# Patient Record
Sex: MALE | Race: WHITE | NOT HISPANIC OR LATINO | Employment: OTHER | URBAN - METROPOLITAN AREA
[De-identification: names, ages, dates, MRNs, and addresses within clinical notes are randomized per-mention and may not be internally consistent; named-entity substitution may affect disease eponyms.]

---

## 2019-01-04 ENCOUNTER — HOSPITAL ENCOUNTER (OUTPATIENT)
Dept: PAIN MEDICINE | Facility: HOSPITAL | Age: 49
Discharge: HOME | End: 2019-01-04
Attending: PHYSICAL MEDICINE & REHABILITATION | Admitting: PHYSICAL MEDICINE & REHABILITATION

## 2019-01-18 ENCOUNTER — HOSPITAL ENCOUNTER (OUTPATIENT)
Dept: PAIN MEDICINE | Facility: HOSPITAL | Age: 49
Discharge: HOME | End: 2019-01-18
Attending: PHYSICAL MEDICINE & REHABILITATION | Admitting: PHYSICAL MEDICINE & REHABILITATION

## 2019-02-01 ENCOUNTER — HOSPITAL ENCOUNTER (OUTPATIENT)
Dept: PAIN MEDICINE | Facility: HOSPITAL | Age: 49
Discharge: HOME | End: 2019-02-01
Attending: PHYSICAL MEDICINE & REHABILITATION | Admitting: PHYSICAL MEDICINE & REHABILITATION

## 2019-02-15 ENCOUNTER — HOSPITAL ENCOUNTER (OUTPATIENT)
Dept: PAIN MEDICINE | Facility: HOSPITAL | Age: 49
Discharge: HOME | End: 2019-02-15
Attending: PHYSICAL MEDICINE & REHABILITATION | Admitting: PHYSICAL MEDICINE & REHABILITATION

## 2019-03-15 ENCOUNTER — HOSPITAL ENCOUNTER (OUTPATIENT)
Dept: PAIN MEDICINE | Facility: HOSPITAL | Age: 49
Discharge: HOME | End: 2019-03-15
Attending: PHYSICAL MEDICINE & REHABILITATION | Admitting: PHYSICAL MEDICINE & REHABILITATION

## 2019-03-20 ENCOUNTER — HOSPITAL ENCOUNTER (OUTPATIENT)
Dept: PAIN MEDICINE | Facility: HOSPITAL | Age: 49
Discharge: HOME | End: 2019-03-20
Attending: PHYSICAL MEDICINE & REHABILITATION | Admitting: PHYSICAL MEDICINE & REHABILITATION

## 2019-04-02 ENCOUNTER — ON CAMPUS - OUTPATIENT (AMBULATORY)
Dept: URBAN - METROPOLITAN AREA HOSPITAL 2 | Facility: HOSPITAL | Age: 49
End: 2019-04-02

## 2019-04-02 ENCOUNTER — OFFICE (AMBULATORY)
Dept: URBAN - METROPOLITAN AREA PATHOLOGY 4 | Facility: PATHOLOGY | Age: 49
End: 2019-04-02

## 2019-04-02 ENCOUNTER — HOSPITAL ENCOUNTER (OUTPATIENT)
Dept: OTHER | Facility: HOSPITAL | Age: 49
Setting detail: SPECIMEN
Discharge: HOME | End: 2019-04-02
Attending: INTERNAL MEDICINE | Admitting: INTERNAL MEDICINE

## 2019-04-02 VITALS
SYSTOLIC BLOOD PRESSURE: 122 MMHG | HEART RATE: 103 BPM | DIASTOLIC BLOOD PRESSURE: 60 MMHG | HEART RATE: 92 BPM | WEIGHT: 267 LBS | DIASTOLIC BLOOD PRESSURE: 73 MMHG | HEART RATE: 104 BPM | RESPIRATION RATE: 16 BRPM | RESPIRATION RATE: 18 BRPM | DIASTOLIC BLOOD PRESSURE: 68 MMHG | SYSTOLIC BLOOD PRESSURE: 101 MMHG | DIASTOLIC BLOOD PRESSURE: 81 MMHG | DIASTOLIC BLOOD PRESSURE: 79 MMHG | HEART RATE: 85 BPM | OXYGEN SATURATION: 98 % | TEMPERATURE: 97 F | OXYGEN SATURATION: 90 % | HEART RATE: 108 BPM | HEART RATE: 106 BPM | DIASTOLIC BLOOD PRESSURE: 53 MMHG | OXYGEN SATURATION: 95 % | DIASTOLIC BLOOD PRESSURE: 62 MMHG | DIASTOLIC BLOOD PRESSURE: 78 MMHG | HEART RATE: 100 BPM | HEART RATE: 94 BPM | SYSTOLIC BLOOD PRESSURE: 113 MMHG | SYSTOLIC BLOOD PRESSURE: 119 MMHG | OXYGEN SATURATION: 97 % | SYSTOLIC BLOOD PRESSURE: 99 MMHG | SYSTOLIC BLOOD PRESSURE: 127 MMHG | OXYGEN SATURATION: 96 % | HEIGHT: 69 IN

## 2019-04-02 DIAGNOSIS — K62.5 HEMORRHAGE OF ANUS AND RECTUM: ICD-10-CM

## 2019-04-02 DIAGNOSIS — D12.3 BENIGN NEOPLASM OF TRANSVERSE COLON: ICD-10-CM

## 2019-04-02 DIAGNOSIS — R19.7 DIARRHEA, UNSPECIFIED: ICD-10-CM

## 2019-04-02 DIAGNOSIS — K64.8 OTHER HEMORRHOIDS: ICD-10-CM

## 2019-04-02 LAB
GI HISTOLOGY: A. UNSPECIFIED: (no result)
GI HISTOLOGY: B. UNSPECIFIED: (no result)
GI HISTOLOGY: PDF REPORT: (no result)

## 2019-04-02 PROCEDURE — 88305 TISSUE EXAM BY PATHOLOGIST: CPT | Mod: 26 | Performed by: INTERNAL MEDICINE

## 2019-04-02 PROCEDURE — 45380 COLONOSCOPY AND BIOPSY: CPT | Performed by: INTERNAL MEDICINE

## 2019-05-08 ENCOUNTER — HOSPITAL ENCOUNTER (OUTPATIENT)
Dept: PAIN MEDICINE | Facility: HOSPITAL | Age: 49
Discharge: HOME | End: 2019-05-08
Attending: PHYSICAL MEDICINE & REHABILITATION | Admitting: PHYSICAL MEDICINE & REHABILITATION

## 2019-07-02 PROBLEM — G89.29 CHRONIC LOW BACK PAIN: Status: ACTIVE | Noted: 2019-01-04

## 2019-07-02 PROBLEM — I10 HYPERTENSION: Status: ACTIVE | Noted: 2019-01-02

## 2019-07-02 PROBLEM — Z79.899 OTHER LONG TERM (CURRENT) DRUG THERAPY: Status: ACTIVE | Noted: 2019-03-15

## 2019-07-02 PROBLEM — E78.5 HYPERLIPIDEMIA: Status: ACTIVE | Noted: 2019-01-02

## 2019-07-02 PROBLEM — M54.16 LUMBAR RADICULOPATHY: Status: ACTIVE | Noted: 2019-01-04

## 2019-07-02 PROBLEM — E55.9 VITAMIN D DEFICIENCY: Status: ACTIVE | Noted: 2019-01-02

## 2019-07-02 PROBLEM — E11.49 TYPE 2 DIABETES MELLITUS WITH OTHER DIABETIC NEUROLOGICAL COMPLICATION (HCC): Status: ACTIVE | Noted: 2018-12-28

## 2019-07-02 PROBLEM — M46.1 SACROILIITIS, NOT ELSEWHERE CLASSIFIED: Status: ACTIVE | Noted: 2019-01-04

## 2019-07-02 PROBLEM — M54.50 CHRONIC LOW BACK PAIN: Status: ACTIVE | Noted: 2019-01-04

## 2019-07-02 PROBLEM — M51.36 DEGENERATION OF INTERVERTEBRAL DISC OF LUMBAR REGION: Status: ACTIVE | Noted: 2019-01-04

## 2019-07-02 RX ORDER — CITALOPRAM 20 MG/1
TABLET ORAL EVERY 24 HOURS
COMMUNITY
Start: 2019-01-02

## 2019-07-02 RX ORDER — METOPROLOL TARTRATE 50 MG/1
TABLET, FILM COATED ORAL EVERY 12 HOURS
COMMUNITY
Start: 2019-01-02

## 2019-07-02 RX ORDER — ATORVASTATIN CALCIUM 40 MG/1
TABLET, FILM COATED ORAL
COMMUNITY
Start: 2019-01-02

## 2019-07-02 RX ORDER — HYDROCODONE BITARTRATE AND ACETAMINOPHEN 10; 325 MG/1; MG/1
TABLET ORAL
COMMUNITY
Start: 2019-03-15 | End: 2019-07-03 | Stop reason: SDUPTHER

## 2019-07-02 RX ORDER — CETIRIZINE HYDROCHLORIDE 10 MG/1
TABLET ORAL
COMMUNITY
Start: 2019-01-02

## 2019-07-02 RX ORDER — LISINOPRIL 20 MG/1
TABLET ORAL
COMMUNITY
Start: 2019-01-02 | End: 2020-05-11

## 2019-07-02 RX ORDER — CHLORAL HYDRATE 500 MG
CAPSULE ORAL
COMMUNITY
Start: 2019-01-02

## 2019-07-02 RX ORDER — GABAPENTIN 600 MG/1
TABLET ORAL EVERY 6 HOURS
COMMUNITY
Start: 2019-03-15 | End: 2019-08-28 | Stop reason: SDUPTHER

## 2019-07-02 RX ORDER — TIZANIDINE HYDROCHLORIDE 6 MG/1
CAPSULE, GELATIN COATED ORAL
COMMUNITY
Start: 2019-01-02 | End: 2020-01-31

## 2019-07-02 RX ORDER — TOPIRAMATE 25 MG/1
TABLET ORAL EVERY 12 HOURS
COMMUNITY
Start: 2019-01-02

## 2019-07-02 RX ORDER — AMLODIPINE BESYLATE 2.5 MG/1
TABLET ORAL
COMMUNITY
Start: 2019-01-02 | End: 2020-05-11 | Stop reason: DRUGHIGH

## 2019-07-02 RX ORDER — BLOOD-GLUCOSE METER
EACH MISCELLANEOUS
COMMUNITY
Start: 2018-12-28

## 2019-07-02 RX ORDER — LANCETS
EACH MISCELLANEOUS
COMMUNITY
Start: 2018-12-28

## 2019-07-02 RX ORDER — PEN NEEDLE, DIABETIC 30 GX3/16"
NEEDLE, DISPOSABLE MISCELLANEOUS
COMMUNITY
Start: 2019-03-28

## 2019-07-02 RX ORDER — ASPIRIN 81 MG/1
TABLET ORAL
COMMUNITY
Start: 2019-01-02

## 2019-07-02 RX ORDER — POTASSIUM CHLORIDE 1500 MG/1
TABLET, FILM COATED, EXTENDED RELEASE ORAL EVERY 12 HOURS
COMMUNITY
Start: 2019-01-02

## 2019-07-02 RX ORDER — HYDROCHLOROTHIAZIDE 25 MG/1
TABLET ORAL EVERY 24 HOURS
COMMUNITY
Start: 2019-01-02

## 2019-07-02 RX ORDER — SPIRONOLACTONE 100 MG/1
TABLET, FILM COATED ORAL
COMMUNITY
Start: 2019-01-02

## 2019-07-02 RX ORDER — HYDRALAZINE HYDROCHLORIDE 100 MG/1
TABLET, FILM COATED ORAL EVERY 8 HOURS
COMMUNITY
Start: 2019-01-02

## 2019-07-03 ENCOUNTER — OFFICE VISIT (OUTPATIENT)
Dept: PAIN MEDICINE | Facility: CLINIC | Age: 49
End: 2019-07-03

## 2019-07-03 VITALS
TEMPERATURE: 97.9 F | DIASTOLIC BLOOD PRESSURE: 56 MMHG | WEIGHT: 267 LBS | RESPIRATION RATE: 16 BRPM | OXYGEN SATURATION: 98 % | HEART RATE: 67 BPM | SYSTOLIC BLOOD PRESSURE: 93 MMHG | HEIGHT: 69 IN | BODY MASS INDEX: 39.55 KG/M2

## 2019-07-03 DIAGNOSIS — M46.1 SACROILIITIS, NOT ELSEWHERE CLASSIFIED (HCC): ICD-10-CM

## 2019-07-03 DIAGNOSIS — G89.29 CHRONIC MIDLINE LOW BACK PAIN WITH SCIATICA, SCIATICA LATERALITY UNSPECIFIED: Primary | ICD-10-CM

## 2019-07-03 DIAGNOSIS — M54.40 CHRONIC MIDLINE LOW BACK PAIN WITH SCIATICA, SCIATICA LATERALITY UNSPECIFIED: Primary | ICD-10-CM

## 2019-07-03 DIAGNOSIS — M54.16 LUMBAR RADICULOPATHY: ICD-10-CM

## 2019-07-03 DIAGNOSIS — M51.36 DEGENERATION OF INTERVERTEBRAL DISC OF LUMBAR REGION: ICD-10-CM

## 2019-07-03 DIAGNOSIS — Z79.899 OTHER LONG TERM (CURRENT) DRUG THERAPY: ICD-10-CM

## 2019-07-03 PROCEDURE — 99213 OFFICE O/P EST LOW 20 MIN: CPT | Performed by: PHYSICAL MEDICINE & REHABILITATION

## 2019-07-03 PROCEDURE — G0463 HOSPITAL OUTPT CLINIC VISIT: HCPCS | Performed by: PHYSICAL MEDICINE & REHABILITATION

## 2019-07-03 RX ORDER — HYDROCODONE BITARTRATE AND ACETAMINOPHEN 10; 325 MG/1; MG/1
1 TABLET ORAL EVERY 6 HOURS PRN
Qty: 120 TABLET | Refills: 0 | Status: SHIPPED | OUTPATIENT
Start: 2019-07-03 | End: 2019-07-03 | Stop reason: SDUPTHER

## 2019-07-03 RX ORDER — HYDROCODONE BITARTRATE AND ACETAMINOPHEN 10; 325 MG/1; MG/1
1 TABLET ORAL EVERY 6 HOURS PRN
Qty: 120 TABLET | Refills: 0 | Status: SHIPPED | OUTPATIENT
Start: 2019-07-03 | End: 2019-08-28 | Stop reason: SDUPTHER

## 2019-07-03 NOTE — PROGRESS NOTES
Subjective   Sung Encarnacion is a 48 y.o. male.     Low back pain radiating to BLE, 8/10 at worst, 7/10 at best, 9/10 today, always present, varies, began 2005, worsening, stabbing, tingling, worse with all activity and lying down, interferes with ADLs, sleep, activity, some relief with LESIs and b/l SIJ injections in [place], some with Gabapentin and Tizanidine. MRI L-spine with DDD and R S1 impingement. Saw PCP, notes reviewed, as above, with referral for pain management, also has DM2 and HTN, on ASA 325mg. Had 1st right L5/S1 ILESI with 2 days significant relief, still improved, similarly with 2nd and 3rd. Started Norco 5/325mg QID prn, helps but not enough, also 7.5mg QID prn, now 10mg QID prn, helps, has pain with severe weather. Would like to repeat LESIs.         The following portions of the patient's history were reviewed and updated as appropriate: allergies, current medications, past family history, past medical history, past social history, past surgical history and problem list.    Review of Systems   Constitutional: Negative for chills, fatigue and fever.   HENT: Negative for hearing loss and trouble swallowing.    Eyes: Negative for visual disturbance.   Respiratory: Negative for shortness of breath.    Cardiovascular: Negative for chest pain.   Gastrointestinal: Negative for abdominal pain, constipation, diarrhea, nausea and vomiting.   Genitourinary: Negative for urinary incontinence.   Musculoskeletal: Positive for back pain. Negative for arthralgias, joint swelling, myalgias and neck pain.   Neurological: Positive for numbness and headache. Negative for dizziness and weakness.       Objective   Physical Exam   Constitutional: He is oriented to person, place, and time. He appears well-developed and well-nourished.   HENT:   Head: Normocephalic and atraumatic.   Eyes: EOM are normal. Pupils are equal, round, and reactive to light.   Neck: Normal range of motion.   Cardiovascular: Normal rate,  regular rhythm, normal heart sounds and intact distal pulses.   Pulmonary/Chest: Breath sounds normal.   Abdominal: Soft. Bowel sounds are normal. He exhibits no distension. There is no tenderness.   Neurological: He is alert and oriented to person, place, and time. He has normal strength and normal reflexes. He displays normal reflexes. A sensory deficit is present.   Decreased in b/l stocking distribution    Psychiatric: He has a normal mood and affect. His behavior is normal. Thought content normal.         Assessment/Plan   Sung was seen today for back pain, pain scale and pain medication.    Diagnoses and all orders for this visit:    Chronic midline low back pain with sciatica, sciatica laterality unspecified    Lumbar radiculopathy    Degeneration of intervertebral disc of lumbar region    Sacroiliitis, not elsewhere classified (CMS/HCC)    Other long term (current) drug therapy        Had 3 L5/S1 ILESIs with relief, plan to repeat in 8/2019.  Reviewed MRI results.  Cont Gabapentin 600mg TID.   Treatment plan will consist of continuing current medication as long as it remains effective and is necessary, while evaluating patient at each visit and determining if the medication can be lowered or discontinued, while also using nonopioid therapies to reduce reliance on opioids.  INspect reviewed, in order. UDS 3/15/19 in order.  Cont Norco 10/325mg QID prn, failed 7.5/325mg QID prn, failed 5/325mg, NSAIDs, APAP, Tramadol in past.  RTC 1 month for LESI.

## 2019-08-07 ENCOUNTER — HOSPITAL ENCOUNTER (OUTPATIENT)
Dept: GENERAL RADIOLOGY | Facility: HOSPITAL | Age: 49
Discharge: HOME | End: 2019-08-07

## 2019-08-07 ENCOUNTER — HOSPITAL ENCOUNTER (OUTPATIENT)
Dept: PAIN MEDICINE | Facility: HOSPITAL | Age: 49
Discharge: HOME | End: 2019-08-07
Admitting: PHYSICAL MEDICINE & REHABILITATION

## 2019-08-07 VITALS
WEIGHT: 167 LBS | HEIGHT: 69 IN | SYSTOLIC BLOOD PRESSURE: 120 MMHG | BODY MASS INDEX: 24.73 KG/M2 | HEART RATE: 67 BPM | DIASTOLIC BLOOD PRESSURE: 70 MMHG | OXYGEN SATURATION: 100 % | RESPIRATION RATE: 16 BRPM

## 2019-08-07 DIAGNOSIS — M54.16 LUMBAR RADICULOPATHY: ICD-10-CM

## 2019-08-07 DIAGNOSIS — R52 PAIN: ICD-10-CM

## 2019-08-07 DIAGNOSIS — G89.29 CHRONIC MIDLINE LOW BACK PAIN WITH SCIATICA, SCIATICA LATERALITY UNSPECIFIED: ICD-10-CM

## 2019-08-07 DIAGNOSIS — M46.1 SACROILIITIS, NOT ELSEWHERE CLASSIFIED (HCC): ICD-10-CM

## 2019-08-07 DIAGNOSIS — M51.36 DEGENERATION OF INTERVERTEBRAL DISC OF LUMBAR REGION: Primary | ICD-10-CM

## 2019-08-07 DIAGNOSIS — M54.40 CHRONIC MIDLINE LOW BACK PAIN WITH SCIATICA, SCIATICA LATERALITY UNSPECIFIED: ICD-10-CM

## 2019-08-07 PROCEDURE — 77003 FLUOROGUIDE FOR SPINE INJECT: CPT

## 2019-08-07 PROCEDURE — 0 IOPAMIDOL 41 % SOLUTION: Performed by: PHYSICAL MEDICINE & REHABILITATION

## 2019-08-07 PROCEDURE — 25010000002 METHYLPREDNISOLONE PER 40 MG: Performed by: PHYSICAL MEDICINE & REHABILITATION

## 2019-08-07 RX ORDER — METHYLPREDNISOLONE ACETATE 40 MG/ML
80 INJECTION, SUSPENSION INTRA-ARTICULAR; INTRALESIONAL; INTRAMUSCULAR; SOFT TISSUE ONCE
Status: COMPLETED | OUTPATIENT
Start: 2019-08-07 | End: 2019-08-07

## 2019-08-07 RX ADMIN — METHYLPREDNISOLONE ACETATE 80 MG: 40 INJECTION, SUSPENSION INTRA-ARTICULAR; INTRALESIONAL; INTRAMUSCULAR; SOFT TISSUE at 11:47

## 2019-08-07 RX ADMIN — IOPAMIDOL 5 ML: 408 INJECTION, SOLUTION INTRATHECAL at 11:46

## 2019-08-07 NOTE — PATIENT INSTRUCTIONS
Epidural Steroid Injection, Care After  Refer to this sheet in the next few weeks. These instructions provide you with information about caring for yourself after your procedure. Your health care provider may also give you more specific instructions. Your treatment has been planned according to current medical practices, but problems sometimes occur. Call your health care provider if you have any problems or questions after your procedure.  What can I expect after the procedure?  After your procedure, it is common to feel a little discomfort at the injection site.  Follow these instructions at home:  · For 24 hours after the procedure:  ? Avoid using heat on the injection site.  ? Do not take a tub bath, and do not soak in water.  ? Do not drive if you received a medicine to help you relax (sedative).  · If directed, put ice on the injection site:  ? Put ice in a plastic bag.  ? Place a towel between your skin and the bag.  ? Leave the ice on for 20 minutes, 2-3 times a day.  · Return to your normal activities as told by your health care provider. Ask your health care provider what activities are safe for you.  · You may remove the bandage (dressing) after 24 hours.  · Take over-the-counter and prescription medicines only as told by your health care provider.  · Keep all follow-up visits as told by your health care provider. This is important.  Contact a health care provider if:  · You have a fever.  · You continue to have pain and soreness around the injection site, even after taking over-the-counter pain medicine.  · You have severe, sudden, or lasting nausea or vomiting.  Get help right away if:  · You have severe pain at the injection site that is not relieved by medicines.  · You develop a severe headache or a stiff neck.  · You become sensitive to light.  · You have any new numbness or weakness in your legs or arms.  · You lose control of your bladder or bowel movements.  · You have trouble breathing.  This  information is not intended to replace advice given to you by your health care provider. Make sure you discuss any questions you have with your health care provider.  Document Released: 04/03/2012 Document Revised: 05/25/2017 Document Reviewed: 04/04/2017  [employer] Interactive Patient Education © 2019 [employer]

## 2019-08-07 NOTE — H&P
Patient Care Team:  Yola Saldaña APRN as PCP - General (Family Medicine)  Alli Saldaña MD as PCP - Family Medicine    Chief complaint Low back pain    Subjective     Low back pain radiating to BLE, 8/10 at worst, 7/10 at best, 9/10 today, always present, varies, began 2005, worsening, stabbing, tingling, worse with all activity and lying down, interferes with ADLs, sleep, activity, some relief with LESIs and b/l SIJ injections in [place], some with Gabapentin and Tizanidine. MRI L-spine with DDD and R S1 impingement. Saw PCP, notes reviewed, as above, with referral for pain management, also has DM2 and HTN, on ASA 325mg. Had 1st right L5/S1 ILESI with 2 days significant relief, still improved, similarly with 2nd and 3rd. Started Norco 5/325mg QID prn, helps but not enough, also 7.5mg QID prn, now 10mg QID prn, helps, has pain with severe weather. Would like to repeat LESIs. Here for 1st of 3. Denies current infection or illness, anticoagulation, allergy to iodine or contrast.        Review of Systems   Respiratory: Negative for shortness of breath.    Cardiovascular: Negative for chest pain.        Past Medical History:   Diagnosis Date   • CHF (congestive heart failure) (CMS/HCC)    • Chronic back pain    • Diabetes mellitus (CMS/HCC)     Type 2   • Hyperlipidemia    • Hypertension    • Lumbar radiculopathy    • Multilevel degenerative disc disease    • Peripheral neuropathy    • Seasonal allergies    • Vitamin D deficiency      Past Surgical History:   Procedure Laterality Date   • CARDIAC CATHETERIZATION     • MOUTH SURGERY       Family History   Problem Relation Age of Onset   • Diabetes Mother    • Hypertension Father    • Cancer Father      Social History     Tobacco Use   • Smoking status: Former Smoker   • Smokeless tobacco: Never Used   Substance Use Topics   • Alcohol use: Yes     Comment: every few months   • Drug use: No       (Not in a hospital admission)  Allergies:  Penicillin  g    Objective      Vital Signs  Resp:  [16] 16  BP: (119)/(72) 119/72    Physical Exam   Cardiovascular: Normal rate, regular rhythm and normal heart sounds.   Pulmonary/Chest: Effort normal and breath sounds normal.       Results Review:   None      Assessment/Plan       * No active hospital problems. *      ICD-10-CM ICD-9-CM   1. Degeneration of intervertebral disc of lumbar region M51.36 722.52   2. Sacroiliitis, not elsewhere classified (CMS/HCC) M46.1 720.2   3. Chronic midline low back pain with sciatica, sciatica laterality unspecified M54.40 724.2    G89.29 724.3     338.29   4. Lumbar radiculopathy M54.16 724.4         Assessment & Plan    I discussed the patients findings and my recommendations with patient     Had 3 L5/S1 ILESIs with relief, 1st of 3 repeat LESIs today.  Reviewed MRI results.  Cont Gabapentin 600mg TID.   Treatment plan will consist of continuing current medication as long as it remains effective and is necessary, while evaluating patient at each visit and determining if the medication can be lowered or discontinued, while also using nonopioid therapies to reduce reliance on opioids.  [facility] reviewed, in order. UDS 3/15/19 in order.  Cont Norco 10/325mg QID prn, failed 7.5/325mg QID prn, failed 5/325mg, NSAIDs, APAP, Tramadol in past.  RTC 1 month for 2nd LESI.      LUMBAR EPIDURAL STEORID INJECTION    PREOPERATIVE DIAGNOSIS: Lumbar radiculopathy    POSTOPERATIVE DIAGNOSIS: Lumbar radiculopathy    PROCEDURE PERFORMED: Lumbar Epidural Steroid Injection    The patient presents with a history of  [ lumbar ] degenerative disc disease with  radiculitis. The patient presents today for a [ lumbar ]  epidural steroid injection at level right L5/S1 This is the [ first ] procedure. The patient understands the risks and benefits of the procedure and wishes to proceed.  The patient was seen in the preoperative area.  Patient's consent was obtained and updated.  Vitals were taken.  Patient was then  brought to the procedure suite and placed in a prone position. The appropriate anatomic area was widely prepped with Chloraprep and draped in a sterile fashion. Under fluoroscopic guidance using [ caudal tilt AP ] view a 20 gauge styleted tuohy needle was passed through skin anesthetized with 1% Lidocaine without epinephrine.  The needle was advanced using the continuous loss of resistance  technique into the epidural space. Needle tip placement in the epidural space was confirmed by loss of resistance and injection of [ 2 ] mL of preservative free contrast.  Following this [ 4 ] mL of a solution of Depomedrol 80mg and saline 3ml was slowly injected after negative aspiration. A sterile dressing was placed over the puncture site.    The patient tolerated the procedure with [ no complications ]. They were then brought to the post procedure area where they recovered nicely.      Juan Antonio Ceja MD  08/07/19  11:53 AM    Time: Discharge 15 min

## 2019-08-28 ENCOUNTER — HOSPITAL ENCOUNTER (OUTPATIENT)
Dept: GENERAL RADIOLOGY | Facility: HOSPITAL | Age: 49
Discharge: HOME | End: 2019-08-28

## 2019-08-28 ENCOUNTER — HOSPITAL ENCOUNTER (OUTPATIENT)
Dept: PAIN MEDICINE | Facility: HOSPITAL | Age: 49
Discharge: HOME | End: 2019-08-28
Admitting: PHYSICAL MEDICINE & REHABILITATION

## 2019-08-28 VITALS
OXYGEN SATURATION: 100 % | RESPIRATION RATE: 16 BRPM | DIASTOLIC BLOOD PRESSURE: 67 MMHG | TEMPERATURE: 98.3 F | BODY MASS INDEX: 37.77 KG/M2 | SYSTOLIC BLOOD PRESSURE: 109 MMHG | HEART RATE: 64 BPM | WEIGHT: 255 LBS | HEIGHT: 69 IN

## 2019-08-28 DIAGNOSIS — G89.29 CHRONIC MIDLINE LOW BACK PAIN WITH SCIATICA, SCIATICA LATERALITY UNSPECIFIED: Primary | ICD-10-CM

## 2019-08-28 DIAGNOSIS — M54.16 LUMBAR RADICULOPATHY: ICD-10-CM

## 2019-08-28 DIAGNOSIS — M51.36 DEGENERATION OF INTERVERTEBRAL DISC OF LUMBAR REGION: ICD-10-CM

## 2019-08-28 DIAGNOSIS — M54.40 CHRONIC MIDLINE LOW BACK PAIN WITH SCIATICA, SCIATICA LATERALITY UNSPECIFIED: Primary | ICD-10-CM

## 2019-08-28 DIAGNOSIS — R52 PAIN: ICD-10-CM

## 2019-08-28 PROCEDURE — 62323 NJX INTERLAMINAR LMBR/SAC: CPT | Performed by: PHYSICAL MEDICINE & REHABILITATION

## 2019-08-28 PROCEDURE — 77003 FLUOROGUIDE FOR SPINE INJECT: CPT

## 2019-08-28 PROCEDURE — 0 IOPAMIDOL 41 % SOLUTION: Performed by: PHYSICAL MEDICINE & REHABILITATION

## 2019-08-28 PROCEDURE — 25010000002 METHYLPREDNISOLONE PER 40 MG: Performed by: PHYSICAL MEDICINE & REHABILITATION

## 2019-08-28 RX ORDER — HYDROCODONE BITARTRATE AND ACETAMINOPHEN 10; 325 MG/1; MG/1
1 TABLET ORAL EVERY 6 HOURS PRN
Qty: 120 TABLET | Refills: 0 | Status: SHIPPED | OUTPATIENT
Start: 2019-08-28 | End: 2019-08-28 | Stop reason: SDUPTHER

## 2019-08-28 RX ORDER — GABAPENTIN 600 MG/1
600 TABLET ORAL 3 TIMES DAILY
Qty: 90 TABLET | Refills: 5 | Status: SHIPPED | OUTPATIENT
Start: 2019-08-28 | End: 2020-07-06

## 2019-08-28 RX ORDER — HYDROCODONE BITARTRATE AND ACETAMINOPHEN 10; 325 MG/1; MG/1
1 TABLET ORAL EVERY 6 HOURS PRN
Qty: 120 TABLET | Refills: 0 | Status: SHIPPED | OUTPATIENT
Start: 2019-08-28 | End: 2019-12-04 | Stop reason: SDUPTHER

## 2019-08-28 RX ORDER — METHYLPREDNISOLONE ACETATE 40 MG/ML
80 INJECTION, SUSPENSION INTRA-ARTICULAR; INTRALESIONAL; INTRAMUSCULAR; SOFT TISSUE ONCE
Status: COMPLETED | OUTPATIENT
Start: 2019-08-28 | End: 2019-08-28

## 2019-08-28 RX ADMIN — METHYLPREDNISOLONE ACETATE 80 MG: 40 INJECTION, SUSPENSION INTRA-ARTICULAR; INTRALESIONAL; INTRAMUSCULAR; SOFT TISSUE at 15:54

## 2019-08-28 RX ADMIN — IOPAMIDOL 5 ML: 408 INJECTION, SOLUTION INTRATHECAL at 09:45

## 2019-08-28 NOTE — H&P
Patient Care Team:  Yola Saldaña APRN as PCP - General (Family Medicine)  Alli Saldaña MD as PCP - Family Medicine    Chief complaint Low back pain    Subjective     Low back pain radiating to BLE, 8/10 at worst, 7/10 at best, always present, varies, began 2005, worsening, stabbing, tingling, worse with all activity and lying down, interferes with ADLs, sleep, activity, some relief with LESIs and b/l SIJ injections in [place], some with Gabapentin and Tizanidine. MRI L-spine with DDD and R S1 impingement. Saw PCP, notes reviewed, as above, with referral for pain management, also has DM2 and HTN, on ASA 325mg. Had 1st right L5/S1 ILESI with 2 days significant relief, still improved, similarly with 2nd and 3rd. Started Norco 5/325mg QID prn, helps but not enough, also 7.5mg QID prn, now 10mg QID prn, helps, has pain with severe weather. Would like to repeat LESIs. Here for 2nd of 3. Denies current infection or illness, anticoagulation, allergy to iodine or contrast. BP 95/69 immediately prior to procedure.        Review of Systems   Respiratory: Negative for shortness of breath.    Cardiovascular: Negative for chest pain.        Past Medical History:   Diagnosis Date   • CHF (congestive heart failure) (CMS/HCC)    • Chronic back pain    • Diabetes mellitus (CMS/HCC)     Type 2   • Hyperlipidemia    • Hypertension    • Lumbar radiculopathy    • Multilevel degenerative disc disease    • Peripheral neuropathy    • Seasonal allergies    • Vitamin D deficiency      Past Surgical History:   Procedure Laterality Date   • CARDIAC CATHETERIZATION     • MOUTH SURGERY       Family History   Problem Relation Age of Onset   • Diabetes Mother    • Hypertension Father    • Cancer Father      Social History     Tobacco Use   • Smoking status: Former Smoker   • Smokeless tobacco: Never Used   Substance Use Topics   • Alcohol use: Yes     Comment: every few months   • Drug use: No       (Not in a hospital  admission)  Allergies:  Penicillin g    Objective      Vital Signs  Temp:  [98.3 °F (36.8 °C)] 98.3 °F (36.8 °C)  Heart Rate:  [63] 63  Resp:  [16] 16  BP: (93)/(57) 93/57    Physical Exam   Cardiovascular: Normal rate, regular rhythm and normal heart sounds.   Pulmonary/Chest: Effort normal and breath sounds normal.       Results Review:   None      Assessment/Plan       * No active hospital problems. *      ICD-10-CM ICD-9-CM   1. Chronic midline low back pain with sciatica, sciatica laterality unspecified M54.40 724.2    G89.29 724.3     338.29   2. Degeneration of intervertebral disc of lumbar region M51.36 722.52   3. Lumbar radiculopathy M54.16 724.4         Assessment & Plan      I discussed the patients findings and my recommendations with patient     Had 3 L5/S1 ILESIs with relief, 1st of 3 repeat LESIs today.  Reviewed MRI results.  Cont Gabapentin 600mg TID.   Treatment plan will consist of continuing current medication as long as it remains effective and is necessary, while evaluating patient at each visit and determining if the medication can be lowered or discontinued, while also using nonopioid therapies to reduce reliance on opioids.  [facility] reviewed, in order. UDS 3/15/19 in order.  Cont Norco 10/325mg QID prn, failed 7.5/325mg QID prn, failed 5/325mg, NSAIDs, APAP, Tramadol in past.  RTC 1 month for 2nd LESI.      LUMBAR EPIDURAL STEORID INJECTION    PREOPERATIVE DIAGNOSIS: Lumbar radiculopathy    POSTOPERATIVE DIAGNOSIS: Lumbar radiculopathy    PROCEDURE PERFORMED: Lumbar Epidural Steroid Injection    The patient presents with a history of  [ lumbar ] degenerative disc disease with  radiculitis. The patient presents today for a [ lumbar ]  epidural steroid injection at level right L5/S1 This is the [ second ] procedure. The patient understands the risks and benefits of the procedure and wishes to proceed.  The patient was seen in the preoperative area.  Patient's consent was obtained and updated.   Vitals were taken.  Patient was then brought to the procedure suite and placed in a prone position. The appropriate anatomic area was widely prepped with Chloraprep and draped in a sterile fashion. Under fluoroscopic guidance using [ caudal tilt AP ] view a 20 gauge styleted tuohy needle was passed through skin anesthetized with 1% Lidocaine without epinephrine.  The needle was advanced using the continuous loss of resistance  technique into the epidural space at 1cm from the needle hub, confirmed in lateral view. Needle tip placement in the epidural space was confirmed by loss of resistance and injection of [ 2 ] mL of preservative free contrast.  Following this [ 4 ] mL of a solution of Depomedrol 80mg and saline 3ml was slowly injected after negative aspiration. A sterile dressing was placed over the puncture site.    The patient tolerated the procedure with [ no complications ]. They were then brought to the post procedure area where they recovered nicely.      Juan Antonio Ceja MD  08/28/19  9:39 AM    Time: Discharge 15 min

## 2019-08-28 NOTE — ADDENDUM NOTE
Encounter addended by: Heather Biggs RN on: 8/28/2019 3:55 PM   Actions taken: Order list changed, Diagnosis association updated, MAR administration accepted

## 2019-08-28 NOTE — PATIENT INSTRUCTIONS
Epidural Steroid Injection, Care After  Refer to this sheet in the next few weeks. These instructions provide you with information about caring for yourself after your procedure. Your health care provider may also give you more specific instructions. Your treatment has been planned according to current medical practices, but problems sometimes occur. Call your health care provider if you have any problems or questions after your procedure.  What can I expect after the procedure?  After your procedure, it is common to feel a little discomfort at the injection site.  Follow these instructions at home:  · For 24 hours after the procedure:  ? Avoid using heat on the injection site.  ? Do not take a tub bath, and do not soak in water.  ? Do not drive if you received a medicine to help you relax (sedative).  · If directed, put ice on the injection site:  ? Put ice in a plastic bag.  ? Place a towel between your skin and the bag.  ? Leave the ice on for 20 minutes, 2-3 times a day.  · Return to your normal activities as told by your health care provider. Ask your health care provider what activities are safe for you.  · You may remove the bandage (dressing) after 24 hours.  · Take over-the-counter and prescription medicines only as told by your health care provider.  · Keep all follow-up visits as told by your health care provider. This is important.  Contact a health care provider if:  · You have a fever.  · You continue to have pain and soreness around the injection site, even after taking over-the-counter pain medicine.  · You have severe, sudden, or lasting nausea or vomiting.  Get help right away if:  · You have severe pain at the injection site that is not relieved by medicines.  · You develop a severe headache or a stiff neck.  · You become sensitive to light.  · You have any new numbness or weakness in your legs or arms.  · You lose control of your bladder or bowel movements.  · You have trouble breathing.  This  information is not intended to replace advice given to you by your health care provider. Make sure you discuss any questions you have with your health care provider.  Document Released: 04/03/2012 Document Revised: 05/25/2017 Document Reviewed: 04/04/2017  [employer] Interactive Patient Education © 2019 [employer]

## 2019-09-16 RX ORDER — INSULIN GLARGINE 100 [IU]/ML
INJECTION, SOLUTION SUBCUTANEOUS
Qty: 10 ML | Refills: 0 | Status: SHIPPED | OUTPATIENT
Start: 2019-09-16

## 2019-09-24 ENCOUNTER — OFFICE (AMBULATORY)
Dept: URBAN - METROPOLITAN AREA CLINIC 64 | Facility: CLINIC | Age: 49
End: 2019-09-24

## 2019-09-24 VITALS
DIASTOLIC BLOOD PRESSURE: 58 MMHG | HEIGHT: 69 IN | SYSTOLIC BLOOD PRESSURE: 98 MMHG | HEART RATE: 70 BPM | WEIGHT: 251 LBS

## 2019-09-24 DIAGNOSIS — K21.9 GASTRO-ESOPHAGEAL REFLUX DISEASE WITHOUT ESOPHAGITIS: ICD-10-CM

## 2019-09-24 DIAGNOSIS — D64.9 ANEMIA, UNSPECIFIED: ICD-10-CM

## 2019-09-24 DIAGNOSIS — K92.1 MELENA: ICD-10-CM

## 2019-09-24 PROCEDURE — 99213 OFFICE O/P EST LOW 20 MIN: CPT | Performed by: NURSE PRACTITIONER

## 2019-09-24 RX ORDER — OMEPRAZOLE 40 MG/1
40 CAPSULE, DELAYED RELEASE ORAL
Qty: 30 | Refills: 11 | Status: ACTIVE
Start: 2019-09-24

## 2019-09-25 ENCOUNTER — HOSPITAL ENCOUNTER (OUTPATIENT)
Dept: PAIN MEDICINE | Facility: HOSPITAL | Age: 49
Discharge: HOME | End: 2019-09-25
Admitting: PHYSICAL MEDICINE & REHABILITATION

## 2019-09-25 VITALS
HEIGHT: 69 IN | HEART RATE: 66 BPM | BODY MASS INDEX: 37.18 KG/M2 | WEIGHT: 251 LBS | OXYGEN SATURATION: 98 % | RESPIRATION RATE: 16 BRPM | SYSTOLIC BLOOD PRESSURE: 107 MMHG | DIASTOLIC BLOOD PRESSURE: 70 MMHG | TEMPERATURE: 98.4 F

## 2019-09-25 DIAGNOSIS — M51.36 DEGENERATION OF INTERVERTEBRAL DISC OF LUMBAR REGION: ICD-10-CM

## 2019-09-25 DIAGNOSIS — G89.29 CHRONIC MIDLINE LOW BACK PAIN WITH SCIATICA, SCIATICA LATERALITY UNSPECIFIED: Primary | ICD-10-CM

## 2019-09-25 DIAGNOSIS — M54.40 CHRONIC MIDLINE LOW BACK PAIN WITH SCIATICA, SCIATICA LATERALITY UNSPECIFIED: Primary | ICD-10-CM

## 2019-09-25 DIAGNOSIS — M54.16 LUMBAR RADICULOPATHY: ICD-10-CM

## 2019-09-25 PROCEDURE — 25010000002 METHYLPREDNISOLONE PER 80 MG: Performed by: PHYSICAL MEDICINE & REHABILITATION

## 2019-09-25 PROCEDURE — 62323 NJX INTERLAMINAR LMBR/SAC: CPT | Performed by: PHYSICAL MEDICINE & REHABILITATION

## 2019-09-25 PROCEDURE — 0 IOPAMIDOL 41 % SOLUTION: Performed by: PHYSICAL MEDICINE & REHABILITATION

## 2019-09-25 RX ORDER — METHYLPREDNISOLONE ACETATE 80 MG/ML
80 INJECTION, SUSPENSION INTRA-ARTICULAR; INTRALESIONAL; INTRAMUSCULAR; SOFT TISSUE ONCE
Status: COMPLETED | OUTPATIENT
Start: 2019-09-25 | End: 2019-09-25

## 2019-09-25 RX ADMIN — IOPAMIDOL 1 ML: 408 INJECTION, SOLUTION INTRATHECAL at 10:21

## 2019-09-25 RX ADMIN — METHYLPREDNISOLONE ACETATE 80 MG: 80 INJECTION, SUSPENSION INTRA-ARTICULAR; INTRALESIONAL; INTRAMUSCULAR; SOFT TISSUE at 10:22

## 2019-09-25 NOTE — PATIENT INSTRUCTIONS
Epidural Steroid Injection, Care After  Refer to this sheet in the next few weeks. These instructions provide you with information about caring for yourself after your procedure. Your health care provider may also give you more specific instructions. Your treatment has been planned according to current medical practices, but problems sometimes occur. Call your health care provider if you have any problems or questions after your procedure.  What can I expect after the procedure?  After your procedure, it is common to feel a little discomfort at the injection site.  Follow these instructions at home:  · For 24 hours after the procedure:  ? Avoid using heat on the injection site.  ? Do not take a tub bath, and do not soak in water.  ? Do not drive if you received a medicine to help you relax (sedative).  · If directed, put ice on the injection site:  ? Put ice in a plastic bag.  ? Place a towel between your skin and the bag.  ? Leave the ice on for 20 minutes, 2-3 times a day.  · Return to your normal activities as told by your health care provider. Ask your health care provider what activities are safe for you.  · You may remove the bandage (dressing) after 24 hours.  · Take over-the-counter and prescription medicines only as told by your health care provider.  · Keep all follow-up visits as told by your health care provider. This is important.  Contact a health care provider if:  · You have a fever.  · You continue to have pain and soreness around the injection site, even after taking over-the-counter pain medicine.  · You have severe, sudden, or lasting nausea or vomiting.  Get help right away if:  · You have severe pain at the injection site that is not relieved by medicines.  · You develop a severe headache or a stiff neck.  · You become sensitive to light.  · You have any new numbness or weakness in your legs or arms.  · You lose control of your bladder or bowel movements.  · You have trouble breathing.  This  information is not intended to replace advice given to you by your health care provider. Make sure you discuss any questions you have with your health care provider.  Document Released: 04/03/2012 Document Revised: 05/25/2017 Document Reviewed: 04/04/2017  [employer] Interactive Patient Education © 2019 [employer]

## 2019-09-25 NOTE — H&P
Patient Care Team:  Yola Saldaña APRN as PCP - General (Family Medicine)  Alli Saldaña MD as PCP - Family Medicine    Chief complaint Low back pain    Subjective     Low back pain radiating to BLE, 8/10 at worst, 7/10 at best, always present, varies, began 2005, worsening, stabbing, tingling, worse with all activity and lying down, interferes with ADLs, sleep, activity, some relief with LESIs and b/l SIJ injections in [place], some with Gabapentin and Tizanidine. MRI L-spine with DDD and R S1 impingement. Saw PCP, notes reviewed, as above, with referral for pain management, also has DM2 and HTN, on ASA 325mg. Had 1st right L5/S1 ILESI with 2 days significant relief, still improved, similarly with 2nd and 3rd. Started Norco 5/325mg QID prn, helps but not enough, also 7.5mg QID prn, now 10mg QID prn, helps, has pain with severe weather. Would like to repeat LESIs. Here for 3rd of 3. Denies current infection or illness, anticoagulation, allergy to iodine or contrast.         Review of Systems   Respiratory: Negative for shortness of breath.    Cardiovascular: Negative for chest pain.        Past Medical History:   Diagnosis Date   • CHF (congestive heart failure) (CMS/HCC)    • Chronic back pain    • Diabetes mellitus (CMS/HCC)     Type 2   • Hyperlipidemia    • Hypertension    • Lumbar radiculopathy    • Multilevel degenerative disc disease    • Peripheral neuropathy    • Seasonal allergies    • Vitamin D deficiency      Past Surgical History:   Procedure Laterality Date   • CARDIAC CATHETERIZATION     • MOUTH SURGERY       Family History   Problem Relation Age of Onset   • Diabetes Mother    • Hypertension Father    • Cancer Father      Social History     Tobacco Use   • Smoking status: Former Smoker   • Smokeless tobacco: Never Used   Substance Use Topics   • Alcohol use: Yes     Comment: every few months   • Drug use: No       (Not in a hospital admission)  Allergies:  Penicillin  g    Objective      Vital Signs  Temp:  [98.4 °F (36.9 °C)] 98.4 °F (36.9 °C)  Heart Rate:  [66] 66  Resp:  [16] 16  BP: (99)/(61) 99/61    Physical Exam   Cardiovascular: Normal rate, regular rhythm and normal heart sounds.   Pulmonary/Chest: Effort normal and breath sounds normal.       Results Review:   None      Assessment/Plan       * No active hospital problems. *      ICD-10-CM ICD-9-CM   1. Chronic midline low back pain with sciatica, sciatica laterality unspecified M54.40 724.2    G89.29 724.3     338.29   2. Degeneration of intervertebral disc of lumbar region M51.36 722.52   3. Lumbar radiculopathy M54.16 724.4         Assessment & Plan      I discussed the patients findings and my recommendations with patient     Had 3 L5/S1 ILESIs with relief, 1st of 3 repeat LESIs today.  Reviewed MRI results.  Cont Gabapentin 600mg TID.   Treatment plan will consist of continuing current medication as long as it remains effective and is necessary, while evaluating patient at each visit and determining if the medication can be lowered or discontinued, while also using nonopioid therapies to reduce reliance on opioids.  [facility] reviewed, in order. UDS 3/15/19 in order.  Cont Norco 10/325mg QID prn, failed 7.5/325mg QID prn, failed 5/325mg, NSAIDs, APAP, Tramadol in past.  RTC 3 months for f/u.      LUMBAR EPIDURAL STEORID INJECTION    PREOPERATIVE DIAGNOSIS: Lumbar radiculopathy    POSTOPERATIVE DIAGNOSIS: Lumbar radiculopathy    PROCEDURE PERFORMED: Lumbar Epidural Steroid Injection    The patient presents with a history of  [ lumbar ] degenerative disc disease with  radiculitis. The patient presents today for a [ lumbar ]  epidural steroid injection at level right L5/S1 This is the [ third ] procedure. The patient understands the risks and benefits of the procedure and wishes to proceed.  The patient was seen in the preoperative area.  Patient's consent was obtained and updated.  Vitals were taken.  Patient was then  brought to the procedure suite and placed in a prone position. The appropriate anatomic area was widely prepped with Chloraprep and draped in a sterile fashion. Under fluoroscopic guidance using [ caudal tilt AP ] view a 20 gauge styleted tuohy needle was passed through skin anesthetized with 1% Lidocaine without epinephrine.  The needle was advanced using the continuous loss of resistance  technique into the epidural space at 1.1cm from the needle hub, confirmed previously in lateral view. Needle tip placement in the epidural space was confirmed by loss of resistance and injection of [ 2 ] mL of preservative free contrast.  Following this [ 4 ] mL of a solution of Depomedrol 80mg and saline 3ml was slowly injected after negative aspiration. A sterile dressing was placed over the puncture site.    The patient tolerated the procedure with [ no complications ]. They were then brought to the post procedure area where they recovered nicely.      Juan Antonio Ceja MD  09/25/19  10:11 AM    Time: Discharge 15 min

## 2019-10-16 ENCOUNTER — ON CAMPUS - OUTPATIENT (AMBULATORY)
Dept: RURAL HOSPITAL 3 | Facility: HOSPITAL | Age: 49
End: 2019-10-16

## 2019-10-16 DIAGNOSIS — D50.0 IRON DEFICIENCY ANEMIA SECONDARY TO BLOOD LOSS (CHRONIC): ICD-10-CM

## 2019-10-16 PROCEDURE — 43235 EGD DIAGNOSTIC BRUSH WASH: CPT | Performed by: INTERNAL MEDICINE

## 2019-11-08 RX ORDER — ATORVASTATIN CALCIUM 40 MG/1
TABLET, FILM COATED ORAL
Qty: 30 TABLET | Refills: 1 | OUTPATIENT
Start: 2019-11-08

## 2019-11-08 RX ORDER — INSULIN GLARGINE 100 [IU]/ML
INJECTION, SOLUTION SUBCUTANEOUS
Qty: 15 ML | Refills: 0 | OUTPATIENT
Start: 2019-11-08

## 2019-11-13 ENCOUNTER — HOSPITAL ENCOUNTER (OUTPATIENT)
Dept: PAIN MEDICINE | Facility: HOSPITAL | Age: 49
Discharge: HOME | End: 2019-11-13

## 2019-11-13 PROCEDURE — 77003 FLUOROGUIDE FOR SPINE INJECT: CPT

## 2019-12-04 ENCOUNTER — OFFICE VISIT (OUTPATIENT)
Dept: PAIN MEDICINE | Facility: CLINIC | Age: 49
End: 2019-12-04

## 2019-12-04 VITALS
HEART RATE: 70 BPM | HEIGHT: 69 IN | OXYGEN SATURATION: 99 % | BODY MASS INDEX: 37.33 KG/M2 | WEIGHT: 252 LBS | RESPIRATION RATE: 16 BRPM | DIASTOLIC BLOOD PRESSURE: 59 MMHG | TEMPERATURE: 97.8 F | SYSTOLIC BLOOD PRESSURE: 99 MMHG

## 2019-12-04 DIAGNOSIS — M54.16 LUMBAR RADICULOPATHY: ICD-10-CM

## 2019-12-04 DIAGNOSIS — M46.1 SACROILIITIS, NOT ELSEWHERE CLASSIFIED (HCC): ICD-10-CM

## 2019-12-04 DIAGNOSIS — G89.29 CHRONIC MIDLINE LOW BACK PAIN WITH SCIATICA, SCIATICA LATERALITY UNSPECIFIED: Primary | ICD-10-CM

## 2019-12-04 DIAGNOSIS — Z79.899 OTHER LONG TERM (CURRENT) DRUG THERAPY: ICD-10-CM

## 2019-12-04 DIAGNOSIS — M54.40 CHRONIC MIDLINE LOW BACK PAIN WITH SCIATICA, SCIATICA LATERALITY UNSPECIFIED: Primary | ICD-10-CM

## 2019-12-04 DIAGNOSIS — M51.36 DEGENERATION OF INTERVERTEBRAL DISC OF LUMBAR REGION: ICD-10-CM

## 2019-12-04 PROCEDURE — 99214 OFFICE O/P EST MOD 30 MIN: CPT | Performed by: PHYSICAL MEDICINE & REHABILITATION

## 2019-12-04 PROCEDURE — G0463 HOSPITAL OUTPT CLINIC VISIT: HCPCS | Performed by: PHYSICAL MEDICINE & REHABILITATION

## 2019-12-04 RX ORDER — HYDROCODONE BITARTRATE AND ACETAMINOPHEN 10; 325 MG/1; MG/1
1 TABLET ORAL EVERY 6 HOURS PRN
Qty: 120 TABLET | Refills: 0 | Status: SHIPPED | OUTPATIENT
Start: 2019-12-04 | End: 2020-01-31 | Stop reason: SDUPTHER

## 2019-12-04 RX ORDER — HYDROCODONE BITARTRATE AND ACETAMINOPHEN 10; 325 MG/1; MG/1
1 TABLET ORAL EVERY 6 HOURS PRN
Qty: 120 TABLET | Refills: 0 | Status: SHIPPED | OUTPATIENT
Start: 2019-12-04 | End: 2019-12-04 | Stop reason: SDUPTHER

## 2019-12-04 NOTE — PROGRESS NOTES
Subjective   Sung Encarnacion is a 49 y.o. male.     Low back pain radiating to BLE, 8/10 at worst, 7/10 at best, 9/10 today, always present, varies, began 2005, worsening, stabbing, tingling, worse with all activity and lying down, interferes with ADLs, sleep, activity, some relief with LESIs and b/l SIJ injections in [place], some with Gabapentin and Tizanidine. MRI L-spine with DDD and R S1 impingement. Saw PCP, notes reviewed, as above, with referral for pain management, also has DM2 and HTN, on ASA 325mg. Had 1st right L5/S1 ILESI with 2 days significant relief, still improved, similarly with 2nd and 3rd. Started Norco 5/325mg QID prn, helps but not enough, also 7.5mg QID prn, now 10mg QID prn, helps, has pain with severe weather. Repeated LESIs per patient's request.         The following portions of the patient's history were reviewed and updated as appropriate: allergies, current medications, past family history, past medical history, past social history, past surgical history and problem list.    Review of Systems   Constitutional: Negative for chills, fatigue and fever.   HENT: Negative for hearing loss and trouble swallowing.    Eyes: Negative for visual disturbance.   Respiratory: Negative for shortness of breath.    Cardiovascular: Negative for chest pain.   Gastrointestinal: Negative for abdominal pain, constipation, diarrhea, nausea and vomiting.   Genitourinary: Negative for urinary incontinence.   Musculoskeletal: Positive for back pain. Negative for arthralgias, joint swelling, myalgias and neck pain.   Neurological: Positive for numbness and headache. Negative for dizziness and weakness.       Objective   Physical Exam   Constitutional: He is oriented to person, place, and time. He appears well-developed and well-nourished.   HENT:   Head: Normocephalic and atraumatic.   Eyes: EOM are normal. Pupils are equal, round, and reactive to light.   Neck: Normal range of motion.   Cardiovascular: Normal  rate, regular rhythm, normal heart sounds and intact distal pulses.   Pulmonary/Chest: Breath sounds normal.   Abdominal: Soft. Bowel sounds are normal. He exhibits no distension. There is no tenderness.   Neurological: He is alert and oriented to person, place, and time. He has normal strength and normal reflexes. He displays normal reflexes. A sensory deficit is present.   Decreased in b/l stocking distribution    Psychiatric: He has a normal mood and affect. His behavior is normal. Thought content normal.         Assessment/Plan   Sung was seen today for back pain.    Diagnoses and all orders for this visit:    Chronic midline low back pain with sciatica, sciatica laterality unspecified    Lumbar radiculopathy    Degeneration of intervertebral disc of lumbar region    Sacroiliitis, not elsewhere classified (CMS/HCC)    Other long term (current) drug therapy        Had 3 L5/S1 ILESIs with relief, repeated, schedule 3 repeat LESIs next visit.  Reviewed MRI results.  Cont Gabapentin 600mg TID.   Treatment plan will consist of continuing current medication as long as it remains effective and is necessary, while evaluating patient at each visit and determining if the medication can be lowered or discontinued, while also using nonopioid therapies to reduce reliance on opioids.  [facility] reviewed, in order. UDS 3/15/19 in order.  Cont Norco 10/325mg QID prn, failed 7.5/325mg QID prn, failed 5/325mg, NSAIDs, APAP, Tramadol in past.  RTC 3 months for f/u, 1st LESI.

## 2020-01-10 RX ORDER — DULAGLUTIDE 1.5 MG/.5ML
INJECTION, SOLUTION SUBCUTANEOUS
Qty: 6 ML | Refills: 1 | OUTPATIENT
Start: 2020-01-10

## 2020-01-31 ENCOUNTER — OFFICE VISIT (OUTPATIENT)
Dept: PAIN MEDICINE | Facility: CLINIC | Age: 50
End: 2020-01-31

## 2020-01-31 ENCOUNTER — HOSPITAL ENCOUNTER (OUTPATIENT)
Dept: PAIN MEDICINE | Facility: HOSPITAL | Age: 50
Discharge: HOME | End: 2020-01-31
Admitting: PHYSICAL MEDICINE & REHABILITATION

## 2020-01-31 VITALS
SYSTOLIC BLOOD PRESSURE: 133 MMHG | OXYGEN SATURATION: 100 % | RESPIRATION RATE: 16 BRPM | DIASTOLIC BLOOD PRESSURE: 69 MMHG | TEMPERATURE: 98.1 F | HEART RATE: 62 BPM

## 2020-01-31 DIAGNOSIS — G89.29 CHRONIC MIDLINE LOW BACK PAIN WITH SCIATICA, SCIATICA LATERALITY UNSPECIFIED: Primary | ICD-10-CM

## 2020-01-31 DIAGNOSIS — Z79.899 OTHER LONG TERM (CURRENT) DRUG THERAPY: ICD-10-CM

## 2020-01-31 DIAGNOSIS — M54.16 LUMBAR RADICULOPATHY: ICD-10-CM

## 2020-01-31 DIAGNOSIS — M54.40 CHRONIC MIDLINE LOW BACK PAIN WITH SCIATICA, SCIATICA LATERALITY UNSPECIFIED: Primary | ICD-10-CM

## 2020-01-31 DIAGNOSIS — M51.36 DEGENERATION OF INTERVERTEBRAL DISC OF LUMBAR REGION: ICD-10-CM

## 2020-01-31 DIAGNOSIS — M46.1 SACROILIITIS, NOT ELSEWHERE CLASSIFIED (HCC): ICD-10-CM

## 2020-01-31 PROCEDURE — 99214 OFFICE O/P EST MOD 30 MIN: CPT | Performed by: PHYSICAL MEDICINE & REHABILITATION

## 2020-01-31 PROCEDURE — G0463 HOSPITAL OUTPT CLINIC VISIT: HCPCS | Performed by: PHYSICAL MEDICINE & REHABILITATION

## 2020-01-31 RX ORDER — OXYCODONE AND ACETAMINOPHEN 7.5; 325 MG/1; MG/1
1 TABLET ORAL EVERY 6 HOURS PRN
Qty: 120 TABLET | Refills: 0 | Status: SHIPPED | OUTPATIENT
Start: 2020-01-31 | End: 2020-01-31 | Stop reason: SDUPTHER

## 2020-01-31 RX ORDER — HYDROCODONE BITARTRATE AND ACETAMINOPHEN 10; 325 MG/1; MG/1
1 TABLET ORAL EVERY 6 HOURS PRN
Qty: 120 TABLET | Refills: 0 | Status: SHIPPED | OUTPATIENT
Start: 2020-01-31 | End: 2020-01-31 | Stop reason: SDUPTHER

## 2020-01-31 RX ORDER — HYDROCODONE BITARTRATE AND ACETAMINOPHEN 10; 325 MG/1; MG/1
1 TABLET ORAL EVERY 6 HOURS PRN
Qty: 120 TABLET | Refills: 0 | Status: SHIPPED | OUTPATIENT
Start: 2020-01-31 | End: 2020-01-31

## 2020-01-31 RX ORDER — OXYCODONE AND ACETAMINOPHEN 7.5; 325 MG/1; MG/1
1 TABLET ORAL EVERY 6 HOURS PRN
Qty: 120 TABLET | Refills: 0 | Status: SHIPPED | OUTPATIENT
Start: 2020-01-31 | End: 2020-04-29 | Stop reason: SDUPTHER

## 2020-01-31 RX ORDER — BACLOFEN 10 MG/1
10 TABLET ORAL 3 TIMES DAILY PRN
Qty: 90 TABLET | Refills: 2 | Status: SHIPPED | OUTPATIENT
Start: 2020-01-31 | End: 2020-07-10

## 2020-01-31 NOTE — PROGRESS NOTES
Subjective   Sung Encarnacion is a 49 y.o. male.     Low back pain radiating to BLE, 8/10 at worst, 7/10 at best, 9/10 today, always present, varies, began 2005, worsening, stabbing, tingling, worse with all activity and lying down, interferes with ADLs, sleep, activity, some relief with LESIs and b/l SIJ injections in [place], some with Gabapentin and Tizanidine. MRI L-spine with DDD and R S1 impingement. Saw PCP, notes reviewed, as above, with referral for pain management, also has DM2 and HTN, on ASA 325mg. Had 1st right L5/S1 ILESI with 2 days significant relief, still improved, similarly with 2nd and 3rd. Started Norco 5/325mg QID prn, helps but not enough, also 7.5mg QID prn, now 10mg QID prn, helps, has pain with severe weather. Repeated LESIs per patient's request. Pain worsening, muscle spasms worsening.       The following portions of the patient's history were reviewed and updated as appropriate: allergies, current medications, past family history, past medical history, past social history, past surgical history and problem list.    Review of Systems   Constitutional: Negative for chills, fatigue and fever.   HENT: Negative for hearing loss and trouble swallowing.    Eyes: Negative for visual disturbance.   Respiratory: Negative for shortness of breath.    Cardiovascular: Negative for chest pain.   Gastrointestinal: Negative for abdominal pain, constipation, diarrhea, nausea and vomiting.   Genitourinary: Negative for urinary incontinence.   Musculoskeletal: Positive for back pain. Negative for arthralgias, joint swelling, myalgias and neck pain.   Neurological: Positive for numbness and headache. Negative for dizziness and weakness.       Objective   Physical Exam   Constitutional: He is oriented to person, place, and time. He appears well-developed and well-nourished.   HENT:   Head: Normocephalic and atraumatic.   Eyes: Pupils are equal, round, and reactive to light. EOM are normal.   Neck: Normal  range of motion.   Cardiovascular: Normal rate, regular rhythm, normal heart sounds and intact distal pulses.   Pulmonary/Chest: Breath sounds normal.   Abdominal: Soft. Bowel sounds are normal. He exhibits no distension. There is no tenderness.   Neurological: He is alert and oriented to person, place, and time. He has normal strength and normal reflexes. He displays normal reflexes. A sensory deficit is present.   Decreased in b/l stocking distribution    Psychiatric: He has a normal mood and affect. His behavior is normal. Thought content normal.         Assessment/Plan   Diagnoses and all orders for this visit:    Chronic midline low back pain with sciatica, sciatica laterality unspecified  -     Discontinue: HYDROcodone-acetaminophen (NORCO)  MG per tablet; Take 1 tablet by mouth Every 6 (Six) Hours As Needed for Moderate Pain .  -     HYDROcodone-acetaminophen (NORCO)  MG per tablet; Take 1 tablet by mouth Every 6 (Six) Hours As Needed for Moderate Pain .    Lumbar radiculopathy    Degeneration of intervertebral disc of lumbar region    Sacroiliitis, not elsewhere classified (CMS/HCC)    Other long term (current) drug therapy        Had 3 L5/S1 ILESIs with relief, repeated, schedule 3 repeat LESIs next visit.  Reviewed MRI results.  Cont Gabapentin 600mg TID.   Treatment plan will consist of continuing current medication as long as it remains effective and is necessary, while evaluating patient at each visit and determining if the medication can be lowered or discontinued, while also using nonopioid therapies to reduce reliance on opioids.  [facility] reviewed, in order. UDS 3/15/19 in order.  Stop Norco 10/325mg QID prn, failed 7.5/325mg QID prn, failed 5/325mg, NSAIDs, APAP, Tramadol in past. Begin Percocet 7.5mg QID prn, Baclofen 10mg TID prn.  RTC for f/u, 1st LESI.

## 2020-02-11 RX ORDER — DULAGLUTIDE 1.5 MG/.5ML
INJECTION, SOLUTION SUBCUTANEOUS
Qty: 6 ML | Refills: 1 | OUTPATIENT
Start: 2020-02-11

## 2020-03-05 RX ORDER — LANCETS 33 GAUGE
EACH MISCELLANEOUS
Qty: 100 EACH | Refills: 1 | OUTPATIENT
Start: 2020-03-05

## 2020-04-29 ENCOUNTER — OFFICE VISIT (OUTPATIENT)
Dept: PAIN MEDICINE | Facility: CLINIC | Age: 50
End: 2020-04-29

## 2020-04-29 VITALS — BODY MASS INDEX: 39.99 KG/M2 | HEIGHT: 69 IN | WEIGHT: 270 LBS

## 2020-04-29 DIAGNOSIS — G89.29 CHRONIC MIDLINE LOW BACK PAIN WITH SCIATICA, SCIATICA LATERALITY UNSPECIFIED: Primary | ICD-10-CM

## 2020-04-29 DIAGNOSIS — M54.40 CHRONIC MIDLINE LOW BACK PAIN WITH SCIATICA, SCIATICA LATERALITY UNSPECIFIED: Primary | ICD-10-CM

## 2020-04-29 DIAGNOSIS — M51.36 DEGENERATION OF INTERVERTEBRAL DISC OF LUMBAR REGION: ICD-10-CM

## 2020-04-29 DIAGNOSIS — Z79.899 OTHER LONG TERM (CURRENT) DRUG THERAPY: ICD-10-CM

## 2020-04-29 DIAGNOSIS — M54.16 LUMBAR RADICULOPATHY: ICD-10-CM

## 2020-04-29 DIAGNOSIS — M46.1 SACROILIITIS, NOT ELSEWHERE CLASSIFIED (HCC): ICD-10-CM

## 2020-04-29 PROCEDURE — 99441 PR PHYS/QHP TELEPHONE EVALUATION 5-10 MIN: CPT | Performed by: PHYSICAL MEDICINE & REHABILITATION

## 2020-04-29 RX ORDER — OMEPRAZOLE 40 MG/1
CAPSULE, DELAYED RELEASE ORAL
COMMUNITY
Start: 2020-04-09

## 2020-04-29 RX ORDER — METFORMIN HYDROCHLORIDE 500 MG/1
TABLET, FILM COATED, EXTENDED RELEASE ORAL
COMMUNITY
Start: 2020-04-09

## 2020-04-29 RX ORDER — AZELASTINE 1 MG/ML
SPRAY, METERED NASAL
COMMUNITY
Start: 2020-02-11

## 2020-04-29 RX ORDER — POTASSIUM CHLORIDE 20 MEQ/1
TABLET, EXTENDED RELEASE ORAL
COMMUNITY
Start: 2020-04-09

## 2020-04-29 RX ORDER — TIZANIDINE 4 MG/1
TABLET ORAL
COMMUNITY
Start: 2020-04-09

## 2020-04-29 RX ORDER — OXYCODONE AND ACETAMINOPHEN 7.5; 325 MG/1; MG/1
1 TABLET ORAL EVERY 6 HOURS PRN
Qty: 120 TABLET | Refills: 0 | Status: SHIPPED | OUTPATIENT
Start: 2020-04-29 | End: 2020-06-08 | Stop reason: SDUPTHER

## 2020-04-29 NOTE — PROGRESS NOTES
Subjective   Sung Encarnacion is a 49 y.o. male.     Low back pain radiating to BLE, 8/10 at worst, 7/10 at best, 9/10 today, always present, varies, began 2005, worsening, stabbing, tingling, worse with all activity and lying down, interferes with ADLs, sleep, activity, some relief with LESIs and b/l SIJ injections in [place], some with Gabapentin and Tizanidine. MRI L-spine with DDD and R S1 impingement. Saw PCP, notes reviewed, as above, with referral for pain management, also has DM2 and HTN, on ASA 325mg. Had 1st right L5/S1 ILESI with 2 days significant relief, still improved, similarly with 2nd and 3rd. Started Norco 5/325mg QID prn, helps but not enough, also 7.5mg QID prn, now 10mg QID prn, helps, has pain with severe weather. Repeated LESIs per patient's request. Pain worsening, muscle spasms worsening.       The following portions of the patient's history were reviewed and updated as appropriate: allergies, current medications, past family history, past medical history, past social history, past surgical history and problem list.    Review of Systems   Constitutional: Negative for chills, fatigue and fever.   HENT: Negative for hearing loss and trouble swallowing.    Eyes: Negative for visual disturbance.   Respiratory: Negative for shortness of breath.    Cardiovascular: Negative for chest pain.   Gastrointestinal: Negative for abdominal pain, constipation, diarrhea, nausea and vomiting.   Genitourinary: Negative for urinary incontinence.   Musculoskeletal: Positive for back pain. Negative for arthralgias, joint swelling, myalgias and neck pain.   Neurological: Positive for numbness and headache. Negative for dizziness and weakness.       Objective   Physical Exam   Constitutional: He is oriented to person, place, and time.   Neurological: He is alert and oriented to person, place, and time. He has normal strength and normal reflexes.   Psychiatric: He has a normal mood and affect. His behavior is  normal. Thought content normal.         Assessment/Plan   Sung was seen today for back pain.    Diagnoses and all orders for this visit:    Chronic midline low back pain with sciatica, sciatica laterality unspecified    Lumbar radiculopathy    Degeneration of intervertebral disc of lumbar region    Sacroiliitis, not elsewhere classified (CMS/HCC)    Other long term (current) drug therapy        Had 3 L5/S1 ILESIs with relief, repeated, schedule 3 repeat LESIs when possible.  Reviewed MRI results.  Cont Gabapentin 600mg TID.   Treatment plan will consist of continuing current medication as long as it remains effective and is necessary, while evaluating patient at each visit and determining if the medication can be lowered or discontinued, while also using nonopioid therapies to reduce reliance on opioids.  [facility] reviewed, in order, filled 4/10/20. UDS 3/15/19 in order.  Stopped Norco 10/325mg QID prn, failed 7.5/325mg QID prn, failed 5/325mg, NSAIDs, APAP, Tramadol in past. Begin Percocet 7.5mg QID prn, Baclofen 10mg TID prn.  RTC for f/u, 1st LESI. Telephone visit, spent 6 minutes discussing medications and LESIs.

## 2020-05-11 ENCOUNTER — HOSPITAL ENCOUNTER (OUTPATIENT)
Dept: PAIN MEDICINE | Facility: HOSPITAL | Age: 50
Discharge: HOME | End: 2020-05-11
Admitting: PHYSICAL MEDICINE & REHABILITATION

## 2020-05-11 ENCOUNTER — HOSPITAL ENCOUNTER (OUTPATIENT)
Dept: GENERAL RADIOLOGY | Facility: HOSPITAL | Age: 50
Discharge: HOME | End: 2020-05-11

## 2020-05-11 VITALS
RESPIRATION RATE: 16 BRPM | OXYGEN SATURATION: 98 % | TEMPERATURE: 98.5 F | BODY MASS INDEX: 39.99 KG/M2 | HEART RATE: 74 BPM | WEIGHT: 270 LBS | DIASTOLIC BLOOD PRESSURE: 72 MMHG | HEIGHT: 69 IN | SYSTOLIC BLOOD PRESSURE: 125 MMHG

## 2020-05-11 DIAGNOSIS — G89.29 CHRONIC MIDLINE LOW BACK PAIN WITH SCIATICA, SCIATICA LATERALITY UNSPECIFIED: Primary | ICD-10-CM

## 2020-05-11 DIAGNOSIS — M54.50 LOW BACK PAIN: ICD-10-CM

## 2020-05-11 DIAGNOSIS — M54.40 CHRONIC MIDLINE LOW BACK PAIN WITH SCIATICA, SCIATICA LATERALITY UNSPECIFIED: Primary | ICD-10-CM

## 2020-05-11 DIAGNOSIS — M54.16 LUMBAR RADICULOPATHY: ICD-10-CM

## 2020-05-11 PROCEDURE — 25010000002 METHYLPREDNISOLONE PER 80 MG: Performed by: PHYSICAL MEDICINE & REHABILITATION

## 2020-05-11 PROCEDURE — 0 IOPAMIDOL 41 % SOLUTION: Performed by: PHYSICAL MEDICINE & REHABILITATION

## 2020-05-11 PROCEDURE — 77003 FLUOROGUIDE FOR SPINE INJECT: CPT

## 2020-05-11 PROCEDURE — 62323 NJX INTERLAMINAR LMBR/SAC: CPT | Performed by: PHYSICAL MEDICINE & REHABILITATION

## 2020-05-11 RX ORDER — OXYCODONE AND ACETAMINOPHEN 7.5; 325 MG/1; MG/1
1 TABLET ORAL EVERY 6 HOURS PRN
Qty: 120 TABLET | Refills: 0 | Status: CANCELLED | OUTPATIENT
Start: 2020-05-11

## 2020-05-11 RX ORDER — AMLODIPINE BESYLATE 5 MG/1
TABLET ORAL
COMMUNITY
Start: 2020-05-08 | End: 2021-01-06 | Stop reason: DRUGHIGH

## 2020-05-11 RX ORDER — METHYLPREDNISOLONE ACETATE 80 MG/ML
80 INJECTION, SUSPENSION INTRA-ARTICULAR; INTRALESIONAL; INTRAMUSCULAR; SOFT TISSUE ONCE
Status: COMPLETED | OUTPATIENT
Start: 2020-05-11 | End: 2020-05-11

## 2020-05-11 RX ADMIN — IOPAMIDOL 10 ML: 408 INJECTION, SOLUTION INTRATHECAL at 09:40

## 2020-05-11 RX ADMIN — METHYLPREDNISOLONE ACETATE 80 MG: 80 INJECTION, SUSPENSION INTRA-ARTICULAR; INTRALESIONAL; INTRAMUSCULAR; SOFT TISSUE at 09:41

## 2020-05-11 NOTE — H&P
Patient Care Team:  Yola Saldaña APRN as PCP - General (Family Medicine)    Chief complaint Low back pain    Subjective     Low back pain radiating to BLE, 8/10 at worst, 7/10 at best, 9/10 today, always present, varies, began 2005, worsening, stabbing, tingling, worse with all activity and lying down, interferes with ADLs, sleep, activity, some relief with LESIs and b/l SIJ injections in [place], some with Gabapentin and Tizanidine. MRI L-spine with DDD and R S1 impingement. Saw PCP, notes reviewed, as above, with referral for pain management, also has DM2 and HTN, on ASA 325mg. Had 1st right L5/S1 ILESI with 2 days significant relief, still improved, similarly with 2nd and 3rd. Started Norco 5/325mg QID prn, helps but not enough, also 7.5mg QID prn, now 10mg QID prn, helps, has pain with severe weather. Would like to repeat LESIs. Here for 1st of 3. Denies current infection or illness, anticoagulation, allergy to iodine or contrast.      Review of Systems   Respiratory: Negative for shortness of breath.    Cardiovascular: Negative for chest pain.        Past Medical History:   Diagnosis Date   • CHF (congestive heart failure) (CMS/HCC)    • Chronic back pain    • Diabetes mellitus (CMS/HCC)     Type 2   • Hyperlipidemia    • Hypertension    • Lumbar radiculopathy    • Multilevel degenerative disc disease    • Peripheral neuropathy    • Seasonal allergies    • Vitamin D deficiency      Past Surgical History:   Procedure Laterality Date   • CARDIAC CATHETERIZATION     • MOUTH SURGERY       Family History   Problem Relation Age of Onset   • Diabetes Mother    • Hypertension Father    • Cancer Father      Social History     Tobacco Use   • Smoking status: Former Smoker   • Smokeless tobacco: Never Used   Substance Use Topics   • Alcohol use: Yes     Comment: every few months   • Drug use: No       (Not in a hospital admission)  Allergies:  Penicillin g    Objective      Vital Signs  Temp:  [98.5 °F (36.9  °C)] 98.5 °F (36.9 °C)  Heart Rate:  [76] 76  Resp:  [16] 16  BP: (121)/(68) 121/68    Physical Exam   Cardiovascular: Normal rate, regular rhythm and normal heart sounds.   Pulmonary/Chest: Effort normal and breath sounds normal.       Results Review:   None      Assessment/Plan       * No active hospital problems. *      ICD-10-CM ICD-9-CM   1. Chronic midline low back pain with sciatica, sciatica laterality unspecified M54.40 724.2    G89.29 724.3     338.29   2. Lumbar radiculopathy M54.16 724.4         Assessment & Plan      I discussed the patients findings and my recommendations with patient     Had 3 L5/S1 ILESIs with relief, 1st of 3 repeat LESIs today.  Reviewed MRI results.  Cont Gabapentin 600mg TID.   Treatment plan will consist of continuing current medication as long as it remains effective and is necessary, while evaluating patient at each visit and determining if the medication can be lowered or discontinued, while also using nonopioid therapies to reduce reliance on opioids.  [facility] reviewed, in order. UDS 3/15/19 in order.  Began Percocet 7.5mg QID prn, failed Norco 10/325mg QID prn, failed 7.5/325mg QID prn, failed 5/325mg, NSAIDs, APAP, Tramadol in past.  RTC 1 month for 2nd LESI.      LUMBAR EPIDURAL STEORID INJECTION    PREOPERATIVE DIAGNOSIS: Lumbar radiculopathy    POSTOPERATIVE DIAGNOSIS: Lumbar radiculopathy    PROCEDURE PERFORMED: Lumbar Epidural Steroid Injection    The patient presents with a history of  [ lumbar ] degenerative disc disease with  radiculitis. The patient presents today for a [ lumbar ]  epidural steroid injection at level right L5/S1 This is the [ first ] procedure. The patient understands the risks and benefits of the procedure and wishes to proceed.  The patient was seen in the preoperative area.  Patient's consent was obtained and updated.  Vitals were taken.  Patient was then brought to the procedure suite and placed in a prone position. The appropriate anatomic  area was widely prepped with Chloraprep and draped in a sterile fashion. Under fluoroscopic guidance using [ caudal tilt AP ] view a 20 gauge styleted tuohy needle was passed through skin anesthetized with 1% Lidocaine without epinephrine at 1.0cm from the needle hub.  The needle was advanced using the continuous loss of resistance  technique into the epidural space. Needle tip placement in the epidural space was confirmed by loss of resistance and injection of [ 2 ] mL of preservative free contrast.  Following this [ 4 ] mL of a solution of Depomedrol 80mg and saline 3ml was slowly injected after negative aspiration. A sterile dressing was placed over the puncture site.    The patient tolerated the procedure with [ no complications ]. They were then brought to the post procedure area where they recovered nicely.      Juan Antonio Ceja MD  05/11/20  09:31    Time: Discharge 15 min

## 2020-05-11 NOTE — PATIENT INSTRUCTIONS
Epidural Steroid Injection, Care After  Refer to this sheet in the next few weeks. These instructions provide you with information about caring for yourself after your procedure. Your health care provider may also give you more specific instructions. Your treatment has been planned according to current medical practices, but problems sometimes occur. Call your health care provider if you have any problems or questions after your procedure.  What can I expect after the procedure?  After your procedure, it is common to feel a little discomfort at the injection site.  Follow these instructions at home:  · For 24 hours after the procedure:  ? Avoid using heat on the injection site.  ? Do not take a tub bath, and do not soak in water.  ? Do not drive if you received a medicine to help you relax (sedative).  · If directed, put ice on the injection site:  ? Put ice in a plastic bag.  ? Place a towel between your skin and the bag.  ? Leave the ice on for 20 minutes, 2-3 times a day.  · Return to your normal activities as told by your health care provider. Ask your health care provider what activities are safe for you.  · You may remove the bandage (dressing) after 24 hours.  · Take over-the-counter and prescription medicines only as told by your health care provider.  · Keep all follow-up visits as told by your health care provider. This is important.  Contact a health care provider if:  · You have a fever.  · You continue to have pain and soreness around the injection site, even after taking over-the-counter pain medicine.  · You have severe, sudden, or lasting nausea or vomiting.  Get help right away if:  · You have severe pain at the injection site that is not relieved by medicines.  · You develop a severe headache or a stiff neck.  · You become sensitive to light.  · You have any new numbness or weakness in your legs or arms.  · You lose control of your bladder or bowel movements.  · You have trouble breathing.  This  information is not intended to replace advice given to you by your health care provider. Make sure you discuss any questions you have with your health care provider.  Document Released: 04/03/2012 Document Revised: 05/25/2017 Document Reviewed: 04/04/2017  [employer] Patient Education © 2020 [employer]

## 2020-06-08 ENCOUNTER — HOSPITAL ENCOUNTER (OUTPATIENT)
Dept: PAIN MEDICINE | Facility: HOSPITAL | Age: 50
Discharge: HOME | End: 2020-06-08

## 2020-06-08 ENCOUNTER — HOSPITAL ENCOUNTER (OUTPATIENT)
Dept: PAIN MEDICINE | Facility: HOSPITAL | Age: 50
Discharge: HOME | End: 2020-06-08
Admitting: PHYSICAL MEDICINE & REHABILITATION

## 2020-06-08 VITALS
HEIGHT: 69 IN | BODY MASS INDEX: 39.99 KG/M2 | RESPIRATION RATE: 16 BRPM | TEMPERATURE: 98.1 F | SYSTOLIC BLOOD PRESSURE: 131 MMHG | OXYGEN SATURATION: 98 % | HEART RATE: 70 BPM | DIASTOLIC BLOOD PRESSURE: 76 MMHG | WEIGHT: 270 LBS

## 2020-06-08 DIAGNOSIS — M54.40 CHRONIC MIDLINE LOW BACK PAIN WITH SCIATICA, SCIATICA LATERALITY UNSPECIFIED: Primary | ICD-10-CM

## 2020-06-08 DIAGNOSIS — G89.29 CHRONIC MIDLINE LOW BACK PAIN WITH SCIATICA, SCIATICA LATERALITY UNSPECIFIED: Primary | ICD-10-CM

## 2020-06-08 DIAGNOSIS — M54.50 LOWER BACK PAIN: ICD-10-CM

## 2020-06-08 DIAGNOSIS — M54.16 LUMBAR RADICULOPATHY: ICD-10-CM

## 2020-06-08 PROCEDURE — 62323 NJX INTERLAMINAR LMBR/SAC: CPT | Performed by: PHYSICAL MEDICINE & REHABILITATION

## 2020-06-08 PROCEDURE — 0 IOPAMIDOL 41 % SOLUTION: Performed by: PHYSICAL MEDICINE & REHABILITATION

## 2020-06-08 PROCEDURE — 77003 FLUOROGUIDE FOR SPINE INJECT: CPT

## 2020-06-08 PROCEDURE — 25010000002 METHYLPREDNISOLONE PER 80 MG: Performed by: PHYSICAL MEDICINE & REHABILITATION

## 2020-06-08 RX ORDER — GABAPENTIN 400 MG/1
CAPSULE ORAL
COMMUNITY
Start: 2020-05-12 | End: 2020-07-06

## 2020-06-08 RX ORDER — OXYCODONE AND ACETAMINOPHEN 7.5; 325 MG/1; MG/1
1 TABLET ORAL EVERY 6 HOURS PRN
Qty: 120 TABLET | Refills: 0 | Status: SHIPPED | OUTPATIENT
Start: 2020-06-08 | End: 2020-07-06 | Stop reason: SDUPTHER

## 2020-06-08 RX ORDER — LISINOPRIL 40 MG/1
TABLET ORAL
COMMUNITY
Start: 2020-06-01

## 2020-06-08 RX ORDER — METHYLPREDNISOLONE ACETATE 80 MG/ML
80 INJECTION, SUSPENSION INTRA-ARTICULAR; INTRALESIONAL; INTRAMUSCULAR; SOFT TISSUE ONCE
Status: COMPLETED | OUTPATIENT
Start: 2020-06-08 | End: 2020-06-08

## 2020-06-08 RX ADMIN — METHYLPREDNISOLONE ACETATE 80 MG: 80 INJECTION, SUSPENSION INTRA-ARTICULAR; INTRALESIONAL; INTRAMUSCULAR; SOFT TISSUE at 09:48

## 2020-06-08 RX ADMIN — IOPAMIDOL 10 ML: 408 INJECTION, SOLUTION INTRATHECAL at 09:48

## 2020-06-08 NOTE — H&P
Patient Care Team:  Nicole Kim APRN as PCP - General (Nurse Practitioner)    Chief complaint Low back pain    Subjective     Low back pain radiating to BLE, 8/10 at worst, 7/10 at best, 9/10 today, always present, varies, began 2005, worsening, stabbing, tingling, worse with all activity and lying down, interferes with ADLs, sleep, activity, some relief with LESIs and b/l SIJ injections in [place], some with Gabapentin and Tizanidine. MRI L-spine with DDD and R S1 impingement. Saw PCP, notes reviewed, as above, with referral for pain management, also has DM2 and HTN, on ASA 325mg. Had 1st right L5/S1 ILESI with 2 days significant relief, still improved, similarly with 2nd and 3rd. Started Norco 5/325mg QID prn, helps but not enough, also 7.5mg QID prn, now 10mg QID prn, helps, has pain with severe weather. Would like to repeat LESIs. Here for 2nd of 3. Denies current infection or illness, anticoagulation, allergy to iodine or contrast.      Review of Systems   Respiratory: Negative for shortness of breath.    Cardiovascular: Negative for chest pain.        Past Medical History:   Diagnosis Date   • CHF (congestive heart failure) (CMS/HCC)    • Chronic back pain    • Diabetes mellitus (CMS/HCC)     Type 2   • Hyperlipidemia    • Hypertension    • Lumbar radiculopathy    • Multilevel degenerative disc disease    • Peripheral neuropathy    • Seasonal allergies    • Vitamin D deficiency      Past Surgical History:   Procedure Laterality Date   • CARDIAC CATHETERIZATION     • MOUTH SURGERY       Family History   Problem Relation Age of Onset   • Diabetes Mother    • Hypertension Father    • Cancer Father      Social History     Tobacco Use   • Smoking status: Former Smoker   • Smokeless tobacco: Never Used   Substance Use Topics   • Alcohol use: Yes     Comment: every few months   • Drug use: No       (Not in a hospital admission)  Allergies:  Penicillin g    Objective      Vital Signs  Temp:  [98.1 °F (36.7  °C)] 98.1 °F (36.7 °C)  Heart Rate:  [68] 68  Resp:  [16] 16  BP: (133)/(77) 133/77    Physical Exam   Cardiovascular: Normal rate, regular rhythm and normal heart sounds.   Pulmonary/Chest: Effort normal and breath sounds normal.       Results Review:   None      Assessment/Plan       * No active hospital problems. *      ICD-10-CM ICD-9-CM   1. Chronic midline low back pain with sciatica, sciatica laterality unspecified M54.40 724.2    G89.29 724.3     338.29   2. Lumbar radiculopathy M54.16 724.4         Assessment & Plan      I discussed the patients findings and my recommendations with patient     Had 3 L5/S1 ILESIs with relief, 2nd of 3 repeat LESIs today.  Reviewed MRI results.  Cont Gabapentin 600mg TID.   Treatment plan will consist of continuing current medication as long as it remains effective and is necessary, while evaluating patient at each visit and determining if the medication can be lowered or discontinued, while also using nonopioid therapies to reduce reliance on opioids.  INspect reviewed, in order. UDS 3/15/19 in order.  Began Percocet 7.5mg QID prn, failed Norco 10/325mg QID prn, failed 7.5/325mg QID prn, failed 5/325mg, NSAIDs, APAP, Tramadol in past.  RTC 1 month for 3rd LESI.      LUMBAR EPIDURAL STEORID INJECTION    PREOPERATIVE DIAGNOSIS: Lumbar radiculopathy    POSTOPERATIVE DIAGNOSIS: Lumbar radiculopathy    PROCEDURE PERFORMED: Lumbar Epidural Steroid Injection    The patient presents with a history of  [ lumbar ] degenerative disc disease with  radiculitis. The patient presents today for a [ lumbar ]  epidural steroid injection at level right L5/S1 This is the [ second ] procedure. The patient understands the risks and benefits of the procedure and wishes to proceed.  The patient was seen in the preoperative area.  Patient's consent was obtained and updated.  Vitals were taken.  Patient was then brought to the procedure suite and placed in a prone position. The appropriate anatomic  area was widely prepped with Chloraprep and draped in a sterile fashion. Under fluoroscopic guidance using [ caudal tilt AP ] view a 20 gauge styleted tuohy needle was passed through skin anesthetized with 1% Lidocaine without epinephrine at 0.5cm from the needle hub.  The needle was advanced using the continuous loss of resistance  technique into the epidural space. Needle tip placement in the epidural space was confirmed by loss of resistance and injection of [ 2 ] mL of preservative free contrast.  Following this [ 4 ] mL of a solution of Depomedrol 80mg and saline 3ml was slowly injected after negative aspiration. A sterile dressing was placed over the puncture site.    The patient tolerated the procedure with [ no complications ]. They were then brought to the post procedure area where they recovered nicely.      Juan Antonio Ceja MD  06/08/20  09:43    Time: Discharge 15 min

## 2020-06-08 NOTE — PATIENT INSTRUCTIONS
Epidural Steroid Injection  An epidural steroid injection is a shot of steroid medicine and numbing medicine that is given into the space between the spinal cord and the bones in your back (epidural space). The shot helps relieve pain caused by an irritated or swollen nerve root.  The amount of pain relief you get from the injection depends on what is causing the nerve to be swollen and irritated, and how long your pain lasts. You are more likely to benefit from this injection if your pain is strong and comes on suddenly rather than if you have had pain for a long time.  Tell a health care provider about:    · Any allergies you have.  · All medicines you are taking, including vitamins, herbs, eye drops, creams, and over-the-counter medicines.  · Any problems you or family members have had with anesthetic medicines.  · Any blood disorders you have.  · Any surgeries you have had.  · Any medical conditions you have.  · Whether you are pregnant or may be pregnant.  What are the risks?  Generally, this is a safe procedure. However, problems may occur, including:  · Headache.  · Bleeding.  · Infection.  · Allergic reaction to medicines.  · Damage to your nerves.  What happens before the procedure?  Staying hydrated  Follow instructions from your health care provider about hydration, which may include:  · Up to 2 hours before the procedure - you may continue to drink clear liquids, such as water, clear fruit juice, black coffee, and plain tea.  Eating and drinking restrictions  Follow instructions from your health care provider about eating and drinking, which may include:  · 8 hours before the procedure - stop eating heavy meals or foods such as meat, fried foods, or fatty foods.  · 6 hours before the procedure - stop eating light meals or foods, such as toast or cereal.  · 6 hours before the procedure - stop drinking milk or drinks that contain milk.  · 2 hours before the procedure - stop drinking clear  liquids.  Medicine  · You may be given medicines to lower anxiety.  · Ask your health care provider about:  ? Changing or stopping your regular medicines. This is especially important if you are taking diabetes medicines or blood thinners.  ? Taking medicines such as aspirin and ibuprofen. These medicines can thin your blood. Do not take these medicines before your procedure if your health care provider instructs you not to.  General instructions  · Plan to have someone take you home from the hospital or clinic.  What happens during the procedure?  · You may receive a medicine to help you relax (sedative).  · You will be asked to lie on your abdomen.  · The injection site will be cleaned.  · A numbing medicine (local anesthetic) will be used to numb the injection site.  · A needle will be inserted through your skin into the epidural space. You may feel some discomfort when this happens. An X-ray machine will be used to make sure the needle is put as close as possible to the affected nerve.  · A steroid medicine and a local anesthetic will be injected into the epidural space.  · The needle will be removed.  · A bandage (dressing) will be put over the injection site.  What happens after the procedure?  · Your blood pressure, heart rate, breathing rate, and blood oxygen level will be monitored until the medicines you were given have worn off.  · Your arm or leg may feel weak or numb for a few hours.  · The injection site may feel sore.  · Do not drive for 24 hours if you received a sedative.  This information is not intended to replace advice given to you by your health care provider. Make sure you discuss any questions you have with your health care provider.  Document Released: 03/26/2009 Document Revised: 11/30/2018 Document Reviewed: 04/04/2017  Elsevier Patient Education © 2020 [employer]

## 2020-07-06 ENCOUNTER — HOSPITAL ENCOUNTER (OUTPATIENT)
Dept: PAIN MEDICINE | Facility: HOSPITAL | Age: 50
Discharge: HOME | End: 2020-07-06
Admitting: PHYSICAL MEDICINE & REHABILITATION

## 2020-07-06 ENCOUNTER — RESULTS ENCOUNTER (OUTPATIENT)
Dept: PAIN MEDICINE | Facility: HOSPITAL | Age: 50
End: 2020-07-06

## 2020-07-06 ENCOUNTER — HOSPITAL ENCOUNTER (OUTPATIENT)
Dept: PAIN MEDICINE | Facility: HOSPITAL | Age: 50
Discharge: HOME | End: 2020-07-06

## 2020-07-06 VITALS
BODY MASS INDEX: 39.99 KG/M2 | HEIGHT: 69 IN | SYSTOLIC BLOOD PRESSURE: 148 MMHG | TEMPERATURE: 97.5 F | HEART RATE: 69 BPM | DIASTOLIC BLOOD PRESSURE: 77 MMHG | OXYGEN SATURATION: 100 % | WEIGHT: 270 LBS | RESPIRATION RATE: 16 BRPM

## 2020-07-06 DIAGNOSIS — M54.40 CHRONIC MIDLINE LOW BACK PAIN WITH SCIATICA, SCIATICA LATERALITY UNSPECIFIED: Primary | ICD-10-CM

## 2020-07-06 DIAGNOSIS — M54.16 LUMBAR RADICULOPATHY: ICD-10-CM

## 2020-07-06 DIAGNOSIS — G89.29 CHRONIC MIDLINE LOW BACK PAIN WITH SCIATICA, SCIATICA LATERALITY UNSPECIFIED: Primary | ICD-10-CM

## 2020-07-06 DIAGNOSIS — M54.50 LOWER BACK PAIN: ICD-10-CM

## 2020-07-06 DIAGNOSIS — Z79.899 ENCOUNTER FOR LONG-TERM (CURRENT) USE OF OTHER MEDICATIONS: Primary | ICD-10-CM

## 2020-07-06 DIAGNOSIS — Z79.899 ENCOUNTER FOR LONG-TERM (CURRENT) USE OF OTHER MEDICATIONS: ICD-10-CM

## 2020-07-06 PROCEDURE — 25010000002 METHYLPREDNISOLONE PER 80 MG: Performed by: PHYSICAL MEDICINE & REHABILITATION

## 2020-07-06 PROCEDURE — 62323 NJX INTERLAMINAR LMBR/SAC: CPT | Performed by: PHYSICAL MEDICINE & REHABILITATION

## 2020-07-06 PROCEDURE — 77003 FLUOROGUIDE FOR SPINE INJECT: CPT

## 2020-07-06 PROCEDURE — 0 IOPAMIDOL 41 % SOLUTION: Performed by: PHYSICAL MEDICINE & REHABILITATION

## 2020-07-06 RX ORDER — OXYCODONE AND ACETAMINOPHEN 7.5; 325 MG/1; MG/1
1 TABLET ORAL EVERY 6 HOURS PRN
Qty: 120 TABLET | Refills: 0 | Status: SHIPPED | OUTPATIENT
Start: 2020-07-06 | End: 2020-10-05 | Stop reason: SDUPTHER

## 2020-07-06 RX ORDER — GABAPENTIN 600 MG/1
600 TABLET ORAL 3 TIMES DAILY
Qty: 90 TABLET | Refills: 5 | Status: SHIPPED | OUTPATIENT
Start: 2020-07-06 | End: 2020-10-05 | Stop reason: SDUPTHER

## 2020-07-06 RX ORDER — FERROUS SULFATE 325(65) MG
325 TABLET ORAL
COMMUNITY

## 2020-07-06 RX ORDER — OXYCODONE AND ACETAMINOPHEN 7.5; 325 MG/1; MG/1
1 TABLET ORAL EVERY 6 HOURS PRN
Qty: 120 TABLET | Refills: 0 | Status: SHIPPED | OUTPATIENT
Start: 2020-07-06 | End: 2020-07-06 | Stop reason: SDUPTHER

## 2020-07-06 RX ORDER — METHYLPREDNISOLONE ACETATE 80 MG/ML
80 INJECTION, SUSPENSION INTRA-ARTICULAR; INTRALESIONAL; INTRAMUSCULAR; SOFT TISSUE ONCE
Status: COMPLETED | OUTPATIENT
Start: 2020-07-06 | End: 2020-07-06

## 2020-07-06 RX ADMIN — IOPAMIDOL 10 ML: 408 INJECTION, SOLUTION INTRATHECAL at 09:40

## 2020-07-06 RX ADMIN — METHYLPREDNISOLONE ACETATE 80 MG: 80 INJECTION, SUSPENSION INTRA-ARTICULAR; INTRALESIONAL; INTRAMUSCULAR; SOFT TISSUE at 09:41

## 2020-07-06 NOTE — H&P
Patient Care Team:  Nicole Kim APRN as PCP - General (Nurse Practitioner)    Chief complaint Low back pain    Subjective     Low back pain radiating to BLE, 8/10 at worst, 7/10 at best, 9/10 today, always present, varies, began 2005, worsening, stabbing, tingling, worse with all activity and lying down, interferes with ADLs, sleep, activity, some relief with LESIs and b/l SIJ injections in [place], some with Gabapentin and Tizanidine. MRI L-spine with DDD and R S1 impingement. Saw PCP, notes reviewed, as above, with referral for pain management, also has DM2 and HTN, on ASA 325mg. Had 1st right L5/S1 ILESI with 2 days significant relief, still improved, similarly with 2nd and 3rd. Started Norco 5/325mg QID prn, helps but not enough, also 7.5mg QID prn, now 10mg QID prn, helps, has pain with severe weather. Would like to repeat LESIs. Here for 3rd of 3. Denies current infection or illness, anticoagulation, allergy to iodine or contrast.      Review of Systems   Respiratory: Negative for shortness of breath.    Cardiovascular: Negative for chest pain.        Past Medical History:   Diagnosis Date   • CHF (congestive heart failure) (CMS/HCC)    • Chronic back pain    • Diabetes mellitus (CMS/HCC)     Type 2   • Hyperlipidemia    • Hypertension    • Lumbar radiculopathy    • Multilevel degenerative disc disease    • Peripheral neuropathy    • Seasonal allergies    • Vitamin D deficiency      Past Surgical History:   Procedure Laterality Date   • CARDIAC CATHETERIZATION     • MOUTH SURGERY       Family History   Problem Relation Age of Onset   • Diabetes Mother    • Hypertension Father    • Cancer Father      Social History     Tobacco Use   • Smoking status: Former Smoker   • Smokeless tobacco: Never Used   Substance Use Topics   • Alcohol use: Yes     Comment: every few months   • Drug use: No       (Not in a hospital admission)  Allergies:  Penicillin g    Objective      Vital Signs  Temp:  [97.5 °F (36.4  °C)] 97.5 °F (36.4 °C)  Heart Rate:  [69] 69  Resp:  [16] 16  BP: (135)/(78) 135/78    Physical Exam   Cardiovascular: Normal rate, regular rhythm and normal heart sounds.   Pulmonary/Chest: Effort normal and breath sounds normal.       Results Review:   None      Assessment/Plan       * No active hospital problems. *      ICD-10-CM ICD-9-CM   1. Chronic midline low back pain with sciatica, sciatica laterality unspecified M54.40 724.2    G89.29 724.3     338.29   2. Lumbar radiculopathy M54.16 724.4         Assessment & Plan      I discussed the patients findings and my recommendations with patient     Had 3 L5/S1 ILESIs with relief, 2nd of 3 repeat LESIs today.  Reviewed MRI results.  Cont Gabapentin 600mg TID.   Treatment plan will consist of continuing current medication as long as it remains effective and is necessary, while evaluating patient at each visit and determining if the medication can be lowered or discontinued, while also using nonopioid therapies to reduce reliance on opioids.  INspect reviewed, in order. UDS 3/15/19 in order.  Began Percocet 7.5mg QID prn, failed Norco 10/325mg QID prn, failed 7.5/325mg QID prn, failed 5/325mg, NSAIDs, APAP, Tramadol in past.  RTC 3 months for f/u.      LUMBAR EPIDURAL STEORID INJECTION    PREOPERATIVE DIAGNOSIS: Lumbar radiculopathy    POSTOPERATIVE DIAGNOSIS: Lumbar radiculopathy    PROCEDURE PERFORMED: Lumbar Epidural Steroid Injection    The patient presents with a history of  [ lumbar ] degenerative disc disease with  radiculitis. The patient presents today for a [ lumbar ]  epidural steroid injection at level right L5/S1 This is the [ third ] procedure. The patient understands the risks and benefits of the procedure and wishes to proceed.  The patient was seen in the preoperative area.  Patient's consent was obtained and updated.  Vitals were taken.  Patient was then brought to the procedure suite and placed in a prone position. The appropriate anatomic area  was widely prepped with Chloraprep and draped in a sterile fashion. Under fluoroscopic guidance using [ caudal tilt AP ] view a 20 gauge styleted tuohy needle was passed through skin anesthetized with 1% Lidocaine without epinephrine at 0.5cm from the needle hub.  The needle was advanced using the continuous loss of resistance  technique into the epidural space. Needle tip placement in the epidural space was confirmed by loss of resistance and injection of [ 2 ] mL of preservative free contrast.  Following this [ 4 ] mL of a solution of Depomedrol 80mg and saline 3ml was slowly injected after negative aspiration. A sterile dressing was placed over the puncture site.    The patient tolerated the procedure with [ no complications ]. They were then brought to the post procedure area where they recovered nicely.      Juan Antonio Ceja MD  07/06/20  09:34    Time: Discharge 15 min

## 2020-07-13 RX ORDER — BACLOFEN 10 MG/1
TABLET ORAL
Qty: 90 TABLET | Refills: 0 | Status: SHIPPED | OUTPATIENT
Start: 2020-07-13 | End: 2020-10-05

## 2020-09-08 RX ORDER — BACLOFEN 10 MG/1
TABLET ORAL
Qty: 90 TABLET | Refills: 0 | OUTPATIENT
Start: 2020-09-08 | End: 2020-10-08

## 2020-10-05 ENCOUNTER — OFFICE VISIT (OUTPATIENT)
Dept: PAIN MEDICINE | Facility: CLINIC | Age: 50
End: 2020-10-05

## 2020-10-05 VITALS
BODY MASS INDEX: 44.43 KG/M2 | SYSTOLIC BLOOD PRESSURE: 137 MMHG | OXYGEN SATURATION: 99 % | RESPIRATION RATE: 16 BRPM | WEIGHT: 300 LBS | TEMPERATURE: 97.8 F | HEART RATE: 83 BPM | HEIGHT: 69 IN | DIASTOLIC BLOOD PRESSURE: 64 MMHG

## 2020-10-05 DIAGNOSIS — M51.36 DEGENERATION OF INTERVERTEBRAL DISC OF LUMBAR REGION: ICD-10-CM

## 2020-10-05 DIAGNOSIS — M46.1 SACROILIITIS, NOT ELSEWHERE CLASSIFIED (HCC): ICD-10-CM

## 2020-10-05 DIAGNOSIS — G89.29 CHRONIC MIDLINE LOW BACK PAIN WITH SCIATICA, SCIATICA LATERALITY UNSPECIFIED: Primary | ICD-10-CM

## 2020-10-05 DIAGNOSIS — M54.40 CHRONIC MIDLINE LOW BACK PAIN WITH SCIATICA, SCIATICA LATERALITY UNSPECIFIED: Primary | ICD-10-CM

## 2020-10-05 DIAGNOSIS — Z79.899 OTHER LONG TERM (CURRENT) DRUG THERAPY: ICD-10-CM

## 2020-10-05 DIAGNOSIS — M54.16 LUMBAR RADICULOPATHY: ICD-10-CM

## 2020-10-05 PROCEDURE — G0463 HOSPITAL OUTPT CLINIC VISIT: HCPCS | Performed by: PHYSICAL MEDICINE & REHABILITATION

## 2020-10-05 PROCEDURE — 99213 OFFICE O/P EST LOW 20 MIN: CPT | Performed by: PHYSICAL MEDICINE & REHABILITATION

## 2020-10-05 RX ORDER — OXYCODONE AND ACETAMINOPHEN 7.5; 325 MG/1; MG/1
1 TABLET ORAL EVERY 6 HOURS PRN
Qty: 120 TABLET | Refills: 0 | Status: SHIPPED | OUTPATIENT
Start: 2020-10-05 | End: 2021-01-06

## 2020-10-05 RX ORDER — GABAPENTIN 600 MG/1
600 TABLET ORAL 3 TIMES DAILY
Qty: 90 TABLET | Refills: 2 | Status: SHIPPED | OUTPATIENT
Start: 2020-10-05 | End: 2020-12-31

## 2020-10-05 RX ORDER — OXYCODONE AND ACETAMINOPHEN 7.5; 325 MG/1; MG/1
1 TABLET ORAL EVERY 6 HOURS PRN
Qty: 120 TABLET | Refills: 0 | Status: SHIPPED | OUTPATIENT
Start: 2020-10-05 | End: 2020-10-05 | Stop reason: SDUPTHER

## 2020-10-05 RX ORDER — BACLOFEN 10 MG/1
TABLET ORAL
Qty: 90 TABLET | Refills: 0 | Status: SHIPPED | OUTPATIENT
Start: 2020-10-05 | End: 2020-11-04

## 2020-10-05 NOTE — PROGRESS NOTES
Subjective   Sung Encarnacion is a 50 y.o. male.     Low back pain radiating to BLE, 8/10 at worst, 7/10 at best, 9/10 today, always present, varies, began 2005, worsening, stabbing, tingling, worse with all activity and lying down, interferes with ADLs, sleep, activity, some relief with LESIs and b/l SIJ injections in [place], some with Gabapentin and Tizanidine. MRI L-spine with DDD and R S1 impingement. Saw PCP, notes reviewed, as above, with referral for pain management, also has DM2 and HTN, on ASA 325mg. Had 1st right L5/S1 ILESI with 2 days significant relief, still improved, similarly with 2nd and 3rd. Started Norco 5/325mg QID prn, helps but not enough, also 7.5mg QID prn, now 10mg QID prn, helps, has pain with severe weather. Repeated LESIs per patient's request. Pain worsening, muscle spasms worsening.       The following portions of the patient's history were reviewed and updated as appropriate: allergies, current medications, past family history, past medical history, past social history, past surgical history and problem list.    Review of Systems   Constitutional: Negative for chills, fatigue and fever.   HENT: Negative for hearing loss and trouble swallowing.    Eyes: Negative for visual disturbance.   Respiratory: Negative for shortness of breath.    Cardiovascular: Negative for chest pain.   Gastrointestinal: Negative for abdominal pain, constipation, diarrhea, nausea and vomiting.   Genitourinary: Negative for urinary incontinence.   Musculoskeletal: Positive for back pain. Negative for arthralgias, joint swelling, myalgias and neck pain.   Neurological: Positive for numbness and headache. Negative for dizziness and weakness.       Objective   Physical Exam   Constitutional: He is oriented to person, place, and time.   Neurological: He is alert and oriented to person, place, and time. He has normal reflexes.   Psychiatric: His behavior is normal. Thought content normal.         Assessment/Plan    Sung was seen today for back pain.    Diagnoses and all orders for this visit:    Chronic midline low back pain with sciatica, sciatica laterality unspecified    Lumbar radiculopathy    Degeneration of intervertebral disc of lumbar region    Sacroiliitis, not elsewhere classified (CMS/HCC)    Other long term (current) drug therapy        Had 3 L5/S1 ILESIs with relief, repeated.  Reviewed MRI results.  Cont Gabapentin 600mg TID. Not taking 400mg TID prescribed by Dr. Saldaña.  Treatment plan will consist of continuing current medication as long as it remains effective and is necessary, while evaluating patient at each visit and determining if the medication can be lowered or discontinued, while also using nonopioid therapies to reduce reliance on opioids.  INspect reviewed, in order. UDS 7/6/20 in order.  Began Percocet 7.5mg QID prn, failed Norco 10/325mg QID prn, failed 7.5/325mg QID prn, failed 5/325mg, NSAIDs, APAP, Tramadol in past.  RTC 3 months for f/u.

## 2020-11-30 RX ORDER — LANCETS 33 GAUGE
EACH MISCELLANEOUS
Qty: 100 EACH | Refills: 2 | OUTPATIENT
Start: 2020-11-30

## 2020-12-29 DIAGNOSIS — M54.16 LUMBAR RADICULOPATHY: ICD-10-CM

## 2020-12-31 RX ORDER — GABAPENTIN 600 MG/1
TABLET ORAL
Qty: 90 TABLET | Refills: 5 | Status: SHIPPED | OUTPATIENT
Start: 2020-12-31 | End: 2021-03-31 | Stop reason: SDUPTHER

## 2021-01-04 RX ORDER — BACLOFEN 10 MG/1
TABLET ORAL
Qty: 90 TABLET | Refills: 0 | OUTPATIENT
Start: 2021-01-04 | End: 2021-02-03

## 2021-01-06 ENCOUNTER — OFFICE VISIT (OUTPATIENT)
Dept: PAIN MEDICINE | Facility: CLINIC | Age: 51
End: 2021-01-06

## 2021-01-06 VITALS
OXYGEN SATURATION: 96 % | WEIGHT: 315 LBS | SYSTOLIC BLOOD PRESSURE: 136 MMHG | RESPIRATION RATE: 16 BRPM | HEART RATE: 91 BPM | TEMPERATURE: 97.1 F | DIASTOLIC BLOOD PRESSURE: 68 MMHG | BODY MASS INDEX: 46.65 KG/M2 | HEIGHT: 69 IN

## 2021-01-06 DIAGNOSIS — Z79.899 OTHER LONG TERM (CURRENT) DRUG THERAPY: ICD-10-CM

## 2021-01-06 DIAGNOSIS — G89.29 CHRONIC MIDLINE LOW BACK PAIN WITH SCIATICA, SCIATICA LATERALITY UNSPECIFIED: Primary | ICD-10-CM

## 2021-01-06 DIAGNOSIS — M54.16 LUMBAR RADICULOPATHY: ICD-10-CM

## 2021-01-06 DIAGNOSIS — M54.40 CHRONIC MIDLINE LOW BACK PAIN WITH SCIATICA, SCIATICA LATERALITY UNSPECIFIED: Primary | ICD-10-CM

## 2021-01-06 DIAGNOSIS — M46.1 SACROILIITIS, NOT ELSEWHERE CLASSIFIED (HCC): ICD-10-CM

## 2021-01-06 DIAGNOSIS — M51.36 DEGENERATION OF INTERVERTEBRAL DISC OF LUMBAR REGION: ICD-10-CM

## 2021-01-06 PROCEDURE — G0463 HOSPITAL OUTPT CLINIC VISIT: HCPCS | Performed by: PHYSICAL MEDICINE & REHABILITATION

## 2021-01-06 PROCEDURE — 99214 OFFICE O/P EST MOD 30 MIN: CPT | Performed by: PHYSICAL MEDICINE & REHABILITATION

## 2021-01-06 RX ORDER — BACLOFEN 10 MG/1
TABLET ORAL
Qty: 90 TABLET | Refills: 0 | OUTPATIENT
Start: 2021-01-06 | End: 2021-02-05

## 2021-01-06 RX ORDER — AMLODIPINE BESYLATE 2.5 MG/1
TABLET ORAL
COMMUNITY
Start: 2020-10-29

## 2021-01-06 RX ORDER — HYDROCODONE BITARTRATE AND ACETAMINOPHEN 10; 325 MG/1; MG/1
1 TABLET ORAL EVERY 6 HOURS PRN
Qty: 120 TABLET | Refills: 0 | Status: SHIPPED | OUTPATIENT
Start: 2021-01-06 | End: 2021-03-31 | Stop reason: SDUPTHER

## 2021-01-06 RX ORDER — MONTELUKAST SODIUM 10 MG/1
TABLET ORAL
COMMUNITY
Start: 2020-11-20

## 2021-01-06 NOTE — PROGRESS NOTES
Subjective   Sung Encarnacion is a 50 y.o. male.     Low back pain radiating to BLE, 8/10 at worst, 7/10 at best, 9/10 today, always present, varies, began 2005, worsening, stabbing, tingling, worse with all activity and lying down, interferes with ADLs, sleep, activity, some relief with LESIs and b/l SIJ injections in [place], some with Gabapentin and Tizanidine. MRI L-spine with DDD and R S1 impingement. Saw PCP, notes reviewed, as above, with referral for pain management, also has DM2 and HTN, on ASA 325mg. Had 1st right L5/S1 ILESI with 2 days significant relief, still improved, similarly with 2nd and 3rd. Started Norco 5/325mg QID prn, helps but not enough, also 7.5mg QID prn, now 10mg QID prn, helps, has pain with severe weather. Repeated LESIs per patient's request. Pain worsening, muscle spasms worsening.       The following portions of the patient's history were reviewed and updated as appropriate: allergies, current medications, past family history, past medical history, past social history, past surgical history and problem list.    Review of Systems   Constitutional: Negative for chills, fatigue and fever.   HENT: Negative for hearing loss and trouble swallowing.    Eyes: Negative for visual disturbance.   Respiratory: Negative for shortness of breath.    Cardiovascular: Negative for chest pain.   Gastrointestinal: Negative for abdominal pain, constipation, diarrhea, nausea and vomiting.   Genitourinary: Negative for urinary incontinence.   Musculoskeletal: Positive for back pain. Negative for arthralgias, joint swelling, myalgias and neck pain.   Neurological: Positive for numbness and headache. Negative for dizziness and weakness.       Objective   Physical Exam   Constitutional: He is oriented to person, place, and time. He appears well-developed and well-nourished.   HENT:   Head: Normocephalic and atraumatic.   Eyes: Pupils are equal, round, and reactive to light.   Neck: Normal range of motion.    Cardiovascular: Normal rate, regular rhythm and normal heart sounds.   Pulmonary/Chest: Breath sounds normal.   Abdominal: Soft. Normal appearance and bowel sounds are normal. He exhibits no distension. There is no abdominal tenderness.   Neurological: He is alert and oriented to person, place, and time. He has normal reflexes. He displays normal reflexes. A sensory deficit is present.   Decreased in b/l stocking distribution       Psychiatric: His behavior is normal. Mood, judgment and thought content normal.         Assessment/Plan   Diagnoses and all orders for this visit:    1. Chronic midline low back pain with sciatica, sciatica laterality unspecified (Primary)    2. Degeneration of intervertebral disc of lumbar region    3. Lumbar radiculopathy    4. Other long term (current) drug therapy    5. Sacroiliitis, not elsewhere classified (CMS/HCC)      INspect reviewed, in order. UDS 7/6/20 in order.  Began Percocet 7.5mg QID prn, failed Norco 10/325mg QID prn, failed 7.5/325mg QID prn, failed 5/325mg, NSAIDs, APAP, Tramadol in past. Restart Norco 10mg QID prn.  Had 3 L5/S1 ILESIs with relief, repeated. Schedule 3 LESIs.  Reviewed MRI results.  Cont Gabapentin 600mg TID. Not taking 400mg TID prescribed by Dr. Saldaña.  Treatment plan will consist of continuing current medication as long as it remains effective and is necessary, while evaluating patient at each visit and determining if the medication can be lowered or discontinued, while also using nonopioid therapies to reduce reliance on opioids.  RTC for LESIs.

## 2021-01-27 ENCOUNTER — HOSPITAL ENCOUNTER (OUTPATIENT)
Dept: PAIN MEDICINE | Facility: HOSPITAL | Age: 51
Discharge: HOME | End: 2021-01-27
Admitting: PHYSICAL MEDICINE & REHABILITATION

## 2021-01-27 VITALS
RESPIRATION RATE: 16 BRPM | HEART RATE: 75 BPM | WEIGHT: 315 LBS | OXYGEN SATURATION: 98 % | DIASTOLIC BLOOD PRESSURE: 71 MMHG | SYSTOLIC BLOOD PRESSURE: 127 MMHG | HEIGHT: 69 IN | TEMPERATURE: 96.9 F | BODY MASS INDEX: 46.65 KG/M2

## 2021-01-27 DIAGNOSIS — G89.29 CHRONIC MIDLINE LOW BACK PAIN WITH SCIATICA, SCIATICA LATERALITY UNSPECIFIED: Primary | ICD-10-CM

## 2021-01-27 DIAGNOSIS — M54.40 CHRONIC MIDLINE LOW BACK PAIN WITH SCIATICA, SCIATICA LATERALITY UNSPECIFIED: Primary | ICD-10-CM

## 2021-01-27 DIAGNOSIS — M54.16 LUMBAR RADICULOPATHY: ICD-10-CM

## 2021-01-27 PROCEDURE — 62323 NJX INTERLAMINAR LMBR/SAC: CPT | Performed by: PHYSICAL MEDICINE & REHABILITATION

## 2021-01-27 PROCEDURE — 25010000002 METHYLPREDNISOLONE PER 80 MG: Performed by: PHYSICAL MEDICINE & REHABILITATION

## 2021-01-27 PROCEDURE — 0 IOPAMIDOL 41 % SOLUTION: Performed by: PHYSICAL MEDICINE & REHABILITATION

## 2021-01-27 RX ORDER — METHYLPREDNISOLONE ACETATE 80 MG/ML
80 INJECTION, SUSPENSION INTRA-ARTICULAR; INTRALESIONAL; INTRAMUSCULAR; SOFT TISSUE ONCE
Status: COMPLETED | OUTPATIENT
Start: 2021-01-27 | End: 2021-01-27

## 2021-01-27 RX ORDER — BACLOFEN 10 MG/1
10 TABLET ORAL 3 TIMES DAILY PRN
Qty: 90 TABLET | Refills: 11 | Status: SHIPPED | OUTPATIENT
Start: 2021-01-27 | End: 2022-02-22

## 2021-01-27 RX ADMIN — IOPAMIDOL 10 ML: 408 INJECTION, SOLUTION INTRATHECAL at 08:26

## 2021-01-27 RX ADMIN — METHYLPREDNISOLONE ACETATE 80 MG: 80 INJECTION, SUSPENSION INTRA-ARTICULAR; INTRALESIONAL; INTRAMUSCULAR; SOFT TISSUE at 08:27

## 2021-01-27 NOTE — H&P
Patient Care Team:  Nicole Kim APRN as PCP - General (Nurse Practitioner)  Juan Antonio Ceja MD as Consulting Physician (Pain Medicine)    Chief complaint Low back pain    Subjective     Low back pain radiating to BLE, 8/10 at worst, 7/10 at best, 9/10 today, always present, varies, began 2005, worsening, stabbing, tingling, worse with all activity and lying down, interferes with ADLs, sleep, activity, some relief with LESIs and b/l SIJ injections in [place], some with Gabapentin and Tizanidine. MRI L-spine with DDD and R S1 impingement. Saw PCP, notes reviewed, as above, with referral for pain management, also has DM2 and HTN, on ASA 325mg. Had 1st right L5/S1 ILESI with 2 days significant relief, still improved, similarly with 2nd and 3rd. Started Norco 5/325mg QID prn, helps but not enough, also 7.5mg QID prn, now 10mg QID prn, helps, has pain with severe weather. Would like to repeat LESIs. Here for 1st of 3. Denies current infection or illness, anticoagulation, allergy to iodine or contrast.      Review of Systems   Respiratory: Negative for shortness of breath.    Cardiovascular: Negative for chest pain.        Past Medical History:   Diagnosis Date   • CHF (congestive heart failure) (CMS/HCC)    • Chronic back pain    • Diabetes mellitus (CMS/HCC)     Type 2   • Hyperlipidemia    • Hypertension    • Lumbar radiculopathy    • Multilevel degenerative disc disease    • Peripheral neuropathy    • Seasonal allergies    • Vitamin D deficiency      Past Surgical History:   Procedure Laterality Date   • CARDIAC CATHETERIZATION     • MOUTH SURGERY       Family History   Problem Relation Age of Onset   • Diabetes Mother    • Hypertension Father    • Cancer Father      Social History     Tobacco Use   • Smoking status: Former Smoker     Types: Cigarettes   • Smokeless tobacco: Never Used   Substance Use Topics   • Alcohol use: Yes     Comment: every few months   • Drug use: No     (Not in a hospital  admission)    Allergies:  Penicillin g and Penicillins    Objective      Vital Signs  Temp:  [96.9 °F (36.1 °C)] 96.9 °F (36.1 °C)  Heart Rate:  [75] 75  Resp:  [16] 16  BP: (137)/(75) 137/75    Physical Exam   Cardiovascular: Normal rate, regular rhythm and normal heart sounds.   Pulmonary/Chest: Effort normal and breath sounds normal.       Results Review:   None      Assessment/Plan       * No active hospital problems. *      ICD-10-CM ICD-9-CM   1. Chronic midline low back pain with sciatica, sciatica laterality unspecified  M54.40 724.2    G89.29 724.3     338.29   2. Lumbar radiculopathy  M54.16 724.4         Assessment & Plan    I discussed the patients findings and my recommendations with patient     INspect reviewed, in order. UDS 7/6/20 in order.  Began Percocet 7.5mg QID prn, failed Norco 10/325mg QID prn, failed 7.5/325mg QID prn, failed 5/325mg, NSAIDs, APAP, Tramadol in past. Restarted Norco 10mg QID prn.  Cont Baclofen 10mg TID prn.  Had 3 L5/S1 ILESIs with relief, repeated. 1st of 3 LESIs.  Reviewed MRI results.  Cont Gabapentin 600mg TID. Not taking 400mg TID prescribed by Dr. Saldaña.  Treatment plan will consist of continuing current medication as long as it remains effective and is necessary, while evaluating patient at each visit and determining if the medication can be lowered or discontinued, while also using nonopioid therapies to reduce reliance on opioids.  RTC for LESIs.      LUMBAR EPIDURAL STEORID INJECTION    PREOPERATIVE DIAGNOSIS: Lumbar radiculopathy    POSTOPERATIVE DIAGNOSIS: Lumbar radiculopathy    PROCEDURE PERFORMED: Lumbar Epidural Steroid Injection    The patient presents with a history of  [ lumbar ] degenerative disc disease with  radiculitis. The patient presents today for a [ lumbar ]  epidural steroid injection at level right L5/S1 This is the [ first ] procedure. The patient understands the risks and benefits of the procedure and wishes to proceed.  The patient was seen  in the preoperative area.  Patient's consent was obtained and updated.  Vitals were taken.  Patient was then brought to the procedure suite and placed in a prone position. The appropriate anatomic area was widely prepped with Chloraprep and draped in a sterile fashion. Under fluoroscopic guidance using [ caudal tilt AP ] view a 20 gauge styleted tuohy needle was passed through skin anesthetized with 1% Lidocaine without epinephrine at 0.1cm from the needle hub.  The needle was advanced using the continuous loss of resistance  technique into the epidural space. Needle tip placement in the epidural space was confirmed by loss of resistance and injection of [ 2 ] mL of preservative free contrast.  Following this [ 4 ] mL of a solution of Depomedrol 80mg and saline 3ml was slowly injected after negative aspiration. A sterile dressing was placed over the puncture site.    The patient tolerated the procedure with [ no complications ]. They were then brought to the post procedure area where they recovered nicely.      Juan Antonio Ceja MD  01/27/21  08:17 EST    Time: Discharge 15 min    Physical Exam  Cardiovascular:      Rate and Rhythm: Normal rate and regular rhythm.      Heart sounds: Normal heart sounds.   Pulmonary:      Effort: Pulmonary effort is normal.      Breath sounds: Normal breath sounds.

## 2021-02-24 ENCOUNTER — HOSPITAL ENCOUNTER (OUTPATIENT)
Dept: PAIN MEDICINE | Facility: HOSPITAL | Age: 51
Discharge: HOME | End: 2021-02-24

## 2021-02-24 VITALS
HEART RATE: 67 BPM | SYSTOLIC BLOOD PRESSURE: 133 MMHG | OXYGEN SATURATION: 97 % | RESPIRATION RATE: 16 BRPM | WEIGHT: 315 LBS | TEMPERATURE: 97.3 F | BODY MASS INDEX: 46.65 KG/M2 | DIASTOLIC BLOOD PRESSURE: 77 MMHG | HEIGHT: 69 IN

## 2021-02-24 DIAGNOSIS — M54.40 CHRONIC MIDLINE LOW BACK PAIN WITH SCIATICA, SCIATICA LATERALITY UNSPECIFIED: Primary | ICD-10-CM

## 2021-02-24 DIAGNOSIS — R52 PAIN: ICD-10-CM

## 2021-02-24 DIAGNOSIS — G89.29 CHRONIC MIDLINE LOW BACK PAIN WITH SCIATICA, SCIATICA LATERALITY UNSPECIFIED: Primary | ICD-10-CM

## 2021-02-24 DIAGNOSIS — M54.16 LUMBAR RADICULOPATHY: ICD-10-CM

## 2021-02-24 PROCEDURE — 62323 NJX INTERLAMINAR LMBR/SAC: CPT | Performed by: PHYSICAL MEDICINE & REHABILITATION

## 2021-02-24 PROCEDURE — 25010000002 METHYLPREDNISOLONE PER 80 MG: Performed by: PHYSICAL MEDICINE & REHABILITATION

## 2021-02-24 PROCEDURE — 0 IOPAMIDOL 41 % SOLUTION: Performed by: PHYSICAL MEDICINE & REHABILITATION

## 2021-02-24 PROCEDURE — 77003 FLUOROGUIDE FOR SPINE INJECT: CPT

## 2021-02-24 RX ORDER — METHYLPREDNISOLONE ACETATE 80 MG/ML
80 INJECTION, SUSPENSION INTRA-ARTICULAR; INTRALESIONAL; INTRAMUSCULAR; SOFT TISSUE ONCE
Status: COMPLETED | OUTPATIENT
Start: 2021-02-24 | End: 2021-02-24

## 2021-02-24 RX ADMIN — METHYLPREDNISOLONE ACETATE 80 MG: 80 INJECTION, SUSPENSION INTRA-ARTICULAR; INTRALESIONAL; INTRAMUSCULAR; SOFT TISSUE at 08:23

## 2021-02-24 RX ADMIN — IOPAMIDOL 10 ML: 408 INJECTION, SOLUTION INTRATHECAL at 08:22

## 2021-02-24 NOTE — H&P
Patient Care Team:  Nicole Kim APRN as PCP - General (Nurse Practitioner)  Juan Antonio Ceja MD as Consulting Physician (Pain Medicine)    Chief complaint Low back pain    Subjective     Low back pain radiating to BLE, 8/10 at worst, 7/10 at best, 9/10 today, always present, varies, began 2005, worsening, stabbing, tingling, worse with all activity and lying down, interferes with ADLs, sleep, activity, some relief with LESIs and b/l SIJ injections in [place], some with Gabapentin and Tizanidine. MRI L-spine with DDD and R S1 impingement. Saw PCP, notes reviewed, as above, with referral for pain management, also has DM2 and HTN, on ASA 325mg. Had 1st right L5/S1 ILESI with 2 days significant relief, still improved, similarly with 2nd and 3rd. Started Norco 5/325mg QID prn, helps but not enough, also 7.5mg QID prn, now 10mg QID prn, helps, has pain with severe weather. Would like to repeat LESIs. Here for 2nd of 3. Denies current infection or illness, anticoagulation, allergy to iodine or contrast.      Review of Systems   Respiratory: Negative for shortness of breath.    Cardiovascular: Negative for chest pain.        Past Medical History:   Diagnosis Date   • CHF (congestive heart failure) (CMS/HCC)    • Chronic back pain    • Diabetes mellitus (CMS/HCC)     Type 2   • Hyperlipidemia    • Hypertension    • Lumbar radiculopathy    • Multilevel degenerative disc disease    • Peripheral neuropathy    • Seasonal allergies    • Vitamin D deficiency      Past Surgical History:   Procedure Laterality Date   • CARDIAC CATHETERIZATION     • MOUTH SURGERY       Family History   Problem Relation Age of Onset   • Diabetes Mother    • Hypertension Father    • Cancer Father      Social History     Tobacco Use   • Smoking status: Former Smoker     Types: Cigarettes   • Smokeless tobacco: Never Used   Substance Use Topics   • Alcohol use: Yes     Comment: every few months   • Drug use: No     (Not in a hospital  admission)    Allergies:  Penicillin g and Penicillins    Objective      Vital Signs  Temp:  [97.3 °F (36.3 °C)] 97.3 °F (36.3 °C)  Heart Rate:  [68] 68  Resp:  [16] 16  BP: (146)/(73) 146/73    Physical Exam   Cardiovascular: Normal rate, regular rhythm and normal heart sounds.   Pulmonary/Chest: Effort normal and breath sounds normal.       Results Review:   None      Assessment/Plan       * No active hospital problems. *      ICD-10-CM ICD-9-CM   1. Chronic midline low back pain with sciatica, sciatica laterality unspecified  M54.40 724.2    G89.29 724.3     338.29   2. Lumbar radiculopathy  M54.16 724.4         Assessment & Plan    I discussed the patients findings and my recommendations with patient     INspect reviewed, in order. UDS 7/6/20 in order.  Began Percocet 7.5mg QID prn, failed Norco 10/325mg QID prn, failed 7.5/325mg QID prn, failed 5/325mg, NSAIDs, APAP, Tramadol in past. Restarted Norco 10mg QID prn.  Cont Baclofen 10mg TID prn.  Had 3 L5/S1 ILESIs with relief, repeated. 2nd of 3 LESIs.  Reviewed MRI results.  Cont Gabapentin 600mg TID. Not taking 400mg TID prescribed by Dr. Saldaña.  Treatment plan will consist of continuing current medication as long as it remains effective and is necessary, while evaluating patient at each visit and determining if the medication can be lowered or discontinued, while also using nonopioid therapies to reduce reliance on opioids.  RTC for LESIs.      LUMBAR EPIDURAL STEORID INJECTION    PREOPERATIVE DIAGNOSIS: Lumbar radiculopathy    POSTOPERATIVE DIAGNOSIS: Lumbar radiculopathy    PROCEDURE PERFORMED: Lumbar Epidural Steroid Injection    The patient presents with a history of  [ lumbar ] degenerative disc disease with  radiculitis. The patient presents today for a [ lumbar ]  epidural steroid injection at level right L5/S1 This is the [ second ] procedure. The patient understands the risks and benefits of the procedure and wishes to proceed.  The patient was seen  in the preoperative area.  Patient's consent was obtained and updated.  Vitals were taken.  Patient was then brought to the procedure suite and placed in a prone position. The appropriate anatomic area was widely prepped with Chloraprep and draped in a sterile fashion. Under fluoroscopic guidance using [ caudal tilt AP ] view a 20 gauge styleted tuohy needle was passed through skin anesthetized with 1% Lidocaine without epinephrine at 0.2cm from the needle hub.  The needle was advanced using the continuous loss of resistance  technique into the epidural space. Needle tip placement in the epidural space was confirmed by loss of resistance and injection of [ 2 ] mL of preservative free contrast.  Following this [ 4 ] mL of a solution of Depomedrol 80mg and saline 3ml was slowly injected after negative aspiration. A sterile dressing was placed over the puncture site.    The patient tolerated the procedure with [ no complications ]. They were then brought to the post procedure area where they recovered nicely.      Juan Antonio Ceja MD  02/24/21  08:09 EST    Time: Discharge 15 min    Physical Exam  Cardiovascular:      Rate and Rhythm: Normal rate and regular rhythm.      Heart sounds: Normal heart sounds.   Pulmonary:      Effort: Pulmonary effort is normal.      Breath sounds: Normal breath sounds.

## 2021-02-26 DIAGNOSIS — G89.29 CHRONIC MIDLINE LOW BACK PAIN WITH SCIATICA, SCIATICA LATERALITY UNSPECIFIED: ICD-10-CM

## 2021-02-26 DIAGNOSIS — M54.40 CHRONIC MIDLINE LOW BACK PAIN WITH SCIATICA, SCIATICA LATERALITY UNSPECIFIED: ICD-10-CM

## 2021-02-26 RX ORDER — HYDROCODONE BITARTRATE AND ACETAMINOPHEN 10; 325 MG/1; MG/1
TABLET ORAL
Qty: 120 TABLET | Refills: 0 | OUTPATIENT
Start: 2021-02-26

## 2021-03-22 ENCOUNTER — PRIOR AUTHORIZATION (OUTPATIENT)
Dept: PAIN MEDICINE | Facility: CLINIC | Age: 51
End: 2021-03-22

## 2021-03-22 ENCOUNTER — TELEPHONE (OUTPATIENT)
Dept: PAIN MEDICINE | Facility: CLINIC | Age: 51
End: 2021-03-22

## 2021-03-22 NOTE — TELEPHONE ENCOUNTER
Caller: RADHA LUIS     Relationship to patient: PATIENT     Best call back number: [phone]    Patient is needing: PT IS CALLING BACK TO RESCHEDULE A PROCEDURE- PLEASE CALL PATIENT BACK [phone]

## 2021-03-22 NOTE — TELEPHONE ENCOUNTER
[employer] auth [id number] for LESI approved eff 3/24-4/6/21  Please call and schedule patient. Thanks!!

## 2021-03-31 ENCOUNTER — HOSPITAL ENCOUNTER (OUTPATIENT)
Dept: PAIN MEDICINE | Facility: HOSPITAL | Age: 51
Discharge: HOME | End: 2021-03-31

## 2021-03-31 VITALS
DIASTOLIC BLOOD PRESSURE: 78 MMHG | OXYGEN SATURATION: 97 % | HEIGHT: 69 IN | BODY MASS INDEX: 46.65 KG/M2 | WEIGHT: 315 LBS | SYSTOLIC BLOOD PRESSURE: 121 MMHG | RESPIRATION RATE: 16 BRPM | TEMPERATURE: 97.1 F | HEART RATE: 71 BPM

## 2021-03-31 DIAGNOSIS — M54.16 LUMBAR RADICULOPATHY: ICD-10-CM

## 2021-03-31 DIAGNOSIS — R52 PAIN: ICD-10-CM

## 2021-03-31 DIAGNOSIS — M54.40 CHRONIC MIDLINE LOW BACK PAIN WITH SCIATICA, SCIATICA LATERALITY UNSPECIFIED: Primary | ICD-10-CM

## 2021-03-31 DIAGNOSIS — G89.29 CHRONIC MIDLINE LOW BACK PAIN WITH SCIATICA, SCIATICA LATERALITY UNSPECIFIED: Primary | ICD-10-CM

## 2021-03-31 PROCEDURE — 77003 FLUOROGUIDE FOR SPINE INJECT: CPT

## 2021-03-31 PROCEDURE — 0 IOPAMIDOL 41 % SOLUTION: Performed by: PHYSICAL MEDICINE & REHABILITATION

## 2021-03-31 PROCEDURE — 62323 NJX INTERLAMINAR LMBR/SAC: CPT | Performed by: PHYSICAL MEDICINE & REHABILITATION

## 2021-03-31 PROCEDURE — 25010000002 METHYLPREDNISOLONE PER 80 MG: Performed by: PHYSICAL MEDICINE & REHABILITATION

## 2021-03-31 RX ORDER — METHYLPREDNISOLONE ACETATE 80 MG/ML
80 INJECTION, SUSPENSION INTRA-ARTICULAR; INTRALESIONAL; INTRAMUSCULAR; SOFT TISSUE ONCE
Status: COMPLETED | OUTPATIENT
Start: 2021-03-31 | End: 2021-03-31

## 2021-03-31 RX ORDER — GABAPENTIN 600 MG/1
600 TABLET ORAL 3 TIMES DAILY
Qty: 90 TABLET | Refills: 5 | Status: SHIPPED | OUTPATIENT
Start: 2021-03-31 | End: 2021-12-27

## 2021-03-31 RX ORDER — HYDROCODONE BITARTRATE AND ACETAMINOPHEN 10; 325 MG/1; MG/1
1 TABLET ORAL EVERY 6 HOURS PRN
Qty: 120 TABLET | Refills: 0 | Status: SHIPPED | OUTPATIENT
Start: 2021-03-31 | End: 2021-06-28 | Stop reason: SDUPTHER

## 2021-03-31 RX ADMIN — IOPAMIDOL 10 ML: 408 INJECTION, SOLUTION INTRATHECAL at 09:01

## 2021-03-31 RX ADMIN — METHYLPREDNISOLONE ACETATE 80 MG: 80 INJECTION, SUSPENSION INTRA-ARTICULAR; INTRALESIONAL; INTRAMUSCULAR; SOFT TISSUE at 09:01

## 2021-03-31 NOTE — PATIENT INSTRUCTIONS
Epidural Steroid Injection    An epidural steroid injection is a shot of steroid medicine and numbing medicine that is given into the space between the spinal cord and the bones of the back (epidural space). The shot helps relieve pain caused by an irritated or swollen nerve root.  The amount of pain relief you get from the injection depends on what is causing the nerve to be swollen and irritated, and how long your pain lasts. You are more likely to benefit from this injection if your pain is strong and comes on suddenly rather than if you have had long-term (chronic) pain.  Tell a health care provider about:  · Any allergies you have.  · All medicines you are taking, including vitamins, herbs, eye drops, creams, and over-the-counter medicines.  · Any problems you or family members have had with anesthetic medicines.  · Any blood disorders you have.  · Any surgeries you have had.  · Any medical conditions you have.  · Whether you are pregnant or may be pregnant.  What are the risks?  Generally, this is a safe procedure. However, problems may occur, including:  · Headache.  · Bleeding.  · Infection.  · Allergic reaction to medicines.  · Nerve damage.  What happens before the procedure?  Staying hydrated  Follow instructions from your health care provider about hydration, which may include:  · Up to 2 hours before the procedure - you may continue to drink clear liquids, such as water, clear fruit juice, black coffee, and plain tea.  Eating and drinking restrictions  Follow instructions from your health care provider about eating and drinking, which may include:  · 8 hours before the procedure - stop eating heavy meals or foods, such as meat, fried foods, or fatty foods.  · 6 hours before the procedure - stop eating light meals or foods, such as toast or cereal.  · 6 hours before the procedure - stop drinking milk or drinks that contain milk.  · 2 hours before the procedure - stop drinking clear  liquids.  Medicines  · You may be given medicines to lower anxiety.  · Ask your health care provider about:  ? Changing or stopping your regular medicines. This is especially important if you are taking diabetes medicines or blood thinners.  ? Taking medicines such as aspirin and ibuprofen. These medicines can thin your blood. Do not take these medicines unless your health care provider tells you to take them.  ? Taking over-the-counter medicines, vitamins, herbs, and supplements.  · Ask your health care provider what steps will be taken to prevent infection.  General instructions  · Plan to have someone take you home from the hospital or clinic.  · If you will be going home right after the procedure, plan to have someone with you for 24 hours.  What happens during the procedure?  · An IV will be inserted into one of your veins.  · You will be given one or more of the following:  ? A medicine to help you relax (sedative).  ? A medicine to numb the area (local anesthetic).  · You will be asked to lie on your abdomen or sit.  · The injection site will be cleaned.  · A needle will be inserted through your skin into the epidural space. This may cause you some discomfort. An X-ray machine will be used to guide the needle as close as possible to the affected nerve.  · A steroid medicine and a local anesthetic will be injected into the epidural space.  · The needle and IV will be removed.  · A bandage (dressing) will be put over the injection site.  The procedure may vary among health care providers and hospitals.  What can I expect after the procedure?  Follow these instructions at home:  Injection site care  · You may remove the bandage (dressing) after 24 hours.  · Check your injection site every day for signs of infection. Check for:  ? Redness, swelling, or pain.  ? Fluid or blood.  ? Warmth.  ? Pus or a bad smell.  Managing pain, stiffness, and swelling  · For 24 hours after the procedure:  ? Avoid using heat on the  injection site.  ? Do not take baths, swim, or use a hot tub until your health care provider approves. Ask your health care provider if you may take a shower. You may only be allowed to take sponge baths.  · If directed, put ice on the injection site. To do this:  ? Put ice in a plastic bag.  ? Place a towel between your skin and the bag.  ? Leave the ice on for 20 minutes, 2-3 times a day.    Activity  · Do not drive for 24 hours if you were given a sedative during your procedure.  · Return to your normal activities as told by your health care provider. Ask your health care provider what activities are safe for you.  General instructions  · Your blood pressure, heart rate, breathing rate, and blood oxygen level will be monitored until you leave the hospital or clinic.  · Your arm or leg may feel weak or numb for a few hours.  · The injection site may feel sore.  · Take over-the-counter and prescription medicines only as told by your health care provider.  · Drink enough fluid to keep your urine pale yellow.  · Keep all follow-up visits as told by your health care provider. This is important.  Contact a health care provider if:  · You have any of these signs of infection:  ? Redness, swelling, or pain around your injection site.  ? Fluid or blood coming from your injection site.  ? Warmth coming from your injection site.  ? Pus or a bad smell coming from your injection site.  ? A fever.  · You continue to have pain and soreness around the injection site, even after taking over-the-counter pain medicine.  · You have severe, sudden, or lasting nausea or vomiting.  Get help right away if:  · You have severe pain at the injection site that is not relieved by medicines.  · You develop a severe headache or a stiff neck.  · You become sensitive to light.  · You have any new numbness or weakness in your legs or arms.  · You lose control of your bladder or bowel movements.  · You have trouble breathing.  Summary  · An  epidural steroid injection is a shot of steroid medicine and numbing medicine that is given into the epidural space.  · The shot helps relieve pain caused by an irritated or swollen nerve root.  · You are more likely to benefit from this injection if your pain is strong and comes on suddenly rather than if you have had chronic pain.  This information is not intended to replace advice given to you by your health care provider. Make sure you discuss any questions you have with your health care provider.  Document Revised: 06/29/2020 Document Reviewed: 06/29/2020  Elsevier Patient Education © 2021 [employer]

## 2021-03-31 NOTE — H&P
Patient Care Team:  Nicole Kim APRN as PCP - General (Nurse Practitioner)  Juan Antonio Ceja MD as Consulting Physician (Pain Medicine)    Chief complaint Low back pain    Subjective     Low back pain radiating to BLE, 8/10 at worst, 7/10 at best, 9/10 today, always present, varies, began 2005, worsening, stabbing, tingling, worse with all activity and lying down, interferes with ADLs, sleep, activity, some relief with LESIs and b/l SIJ injections in [place], some with Gabapentin and Tizanidine. MRI L-spine with DDD and R S1 impingement. Saw PCP, notes reviewed, as above, with referral for pain management, also has DM2 and HTN, on ASA 325mg. Had 1st right L5/S1 ILESI with 2 days significant relief, still improved, similarly with 2nd and 3rd. Started Norco 5/325mg QID prn, helps but not enough, also 7.5mg QID prn, now 10mg QID prn, helps, has pain with severe weather. Would like to repeat LESIs. Here for 3rd of 3. Denies current infection or illness, anticoagulation, allergy to iodine or contrast.      Review of Systems   Respiratory: Negative for shortness of breath.    Cardiovascular: Negative for chest pain.        Past Medical History:   Diagnosis Date   • CHF (congestive heart failure) (CMS/HCC)    • Chronic back pain    • Diabetes mellitus (CMS/HCC)     Type 2   • Hyperlipidemia    • Hypertension    • Lumbar radiculopathy    • Multilevel degenerative disc disease    • Peripheral neuropathy    • Seasonal allergies    • Vitamin D deficiency      Past Surgical History:   Procedure Laterality Date   • CARDIAC CATHETERIZATION     • MOUTH SURGERY       Family History   Problem Relation Age of Onset   • Diabetes Mother    • Hypertension Father    • Cancer Father      Social History     Tobacco Use   • Smoking status: Former Smoker     Types: Cigarettes   • Smokeless tobacco: Never Used   Vaping Use   • Vaping Use: Never used   Substance Use Topics   • Alcohol use: Yes     Comment: every few months   •  Drug use: No     (Not in a hospital admission)    Allergies:  Penicillin g and Penicillins    Objective      Vital Signs       Physical Exam   Cardiovascular: Normal rate, regular rhythm and normal heart sounds.   Pulmonary/Chest: Effort normal and breath sounds normal.       Results Review:   None      Assessment/Plan       * No active hospital problems. *      ICD-10-CM ICD-9-CM   1. Chronic midline low back pain with sciatica, sciatica laterality unspecified  M54.40 724.2    G89.29 724.3     338.29   2. Lumbar radiculopathy  M54.16 724.4         Assessment & Plan    I discussed the patients findings and my recommendations with patient     INspect reviewed, in order. UDS 7/6/20 in order.  Began Percocet 7.5mg QID prn, failed Norco 10/325mg QID prn, failed 7.5/325mg QID prn, failed 5/325mg, NSAIDs, APAP, Tramadol in past. Restarted Norco 10mg QID prn.  Cont Baclofen 10mg TID prn.  Had 3 L5/S1 ILESIs with relief, repeated. 3rd of 3 LESIs.  Reviewed MRI results.  Cont Gabapentin 600mg TID. Not taking 400mg TID prescribed by Dr. Saldaña.  Treatment plan will consist of continuing current medication as long as it remains effective and is necessary, while evaluating patient at each visit and determining if the medication can be lowered or discontinued, while also using nonopioid therapies to reduce reliance on opioids.  RTC for LESIs.      LUMBAR EPIDURAL STEORID INJECTION    PREOPERATIVE DIAGNOSIS: Lumbar radiculopathy    POSTOPERATIVE DIAGNOSIS: Lumbar radiculopathy    PROCEDURE PERFORMED: Lumbar Epidural Steroid Injection    The patient presents with a history of  [ lumbar ] degenerative disc disease with  radiculitis. The patient presents today for a [ lumbar ]  epidural steroid injection at level right L5/S1 This is the [ third ] procedure. The patient understands the risks and benefits of the procedure and wishes to proceed.  The patient was seen in the preoperative area.  Patient's consent was obtained and  updated.  Vitals were taken.  Patient was then brought to the procedure suite and placed in a prone position. The appropriate anatomic area was widely prepped with Chloraprep and draped in a sterile fashion. Under fluoroscopic guidance using [ caudal tilt AP ] view a 20 gauge styleted tuohy needle was passed through skin anesthetized with 1% Lidocaine without epinephrine at 0.2cm from the needle hub.  The needle was advanced using the continuous loss of resistance  technique into the epidural space. Needle tip placement in the epidural space was confirmed by loss of resistance and injection of [ 2 ] mL of preservative free contrast.  Following this [ 4 ] mL of a solution of Depomedrol 80mg and saline 3ml was slowly injected after negative aspiration. A sterile dressing was placed over the puncture site.    The patient tolerated the procedure with [ no complications ]. They were then brought to the post procedure area where they recovered nicely.      Juan Antonio Ceja MD  03/31/21  08:53 EDT    Time: Discharge 15 min

## 2021-05-04 RX ORDER — LANCETS 33 GAUGE
EACH MISCELLANEOUS
Qty: 100 EACH | Refills: 2 | OUTPATIENT
Start: 2021-05-04

## 2021-06-28 ENCOUNTER — OFFICE VISIT (OUTPATIENT)
Dept: PAIN MEDICINE | Facility: CLINIC | Age: 51
End: 2021-06-28

## 2021-06-28 VITALS
TEMPERATURE: 97.1 F | RESPIRATION RATE: 16 BRPM | HEIGHT: 69 IN | SYSTOLIC BLOOD PRESSURE: 155 MMHG | WEIGHT: 315 LBS | BODY MASS INDEX: 46.65 KG/M2 | HEART RATE: 80 BPM | OXYGEN SATURATION: 98 % | DIASTOLIC BLOOD PRESSURE: 82 MMHG

## 2021-06-28 DIAGNOSIS — G89.29 CHRONIC MIDLINE LOW BACK PAIN WITH SCIATICA, SCIATICA LATERALITY UNSPECIFIED: Primary | ICD-10-CM

## 2021-06-28 DIAGNOSIS — M54.16 LUMBAR RADICULOPATHY: ICD-10-CM

## 2021-06-28 DIAGNOSIS — M54.40 CHRONIC MIDLINE LOW BACK PAIN WITH SCIATICA, SCIATICA LATERALITY UNSPECIFIED: Primary | ICD-10-CM

## 2021-06-28 DIAGNOSIS — M46.1 SACROILIITIS, NOT ELSEWHERE CLASSIFIED (HCC): ICD-10-CM

## 2021-06-28 DIAGNOSIS — Z79.899 OTHER LONG TERM (CURRENT) DRUG THERAPY: ICD-10-CM

## 2021-06-28 PROCEDURE — 99213 OFFICE O/P EST LOW 20 MIN: CPT | Performed by: PHYSICAL MEDICINE & REHABILITATION

## 2021-06-28 PROCEDURE — G0463 HOSPITAL OUTPT CLINIC VISIT: HCPCS | Performed by: PHYSICAL MEDICINE & REHABILITATION

## 2021-06-28 RX ORDER — HYDROCODONE BITARTRATE AND ACETAMINOPHEN 10; 325 MG/1; MG/1
1 TABLET ORAL EVERY 6 HOURS PRN
Qty: 120 TABLET | Refills: 0 | Status: SHIPPED | OUTPATIENT
Start: 2021-06-28 | End: 2021-09-27 | Stop reason: SDUPTHER

## 2021-06-28 NOTE — PROGRESS NOTES
Subjective   Sung Encarnacion is a 50 y.o. male.     Low back pain radiating to BLE, 8/10 at worst, 7/10 at best, 9/10 today, always present, varies, began 2005, worsening, stabbing, tingling, worse with all activity and lying down, interferes with ADLs, sleep, activity, some relief with LESIs and b/l SIJ injections in [place], some with Gabapentin and Tizanidine. MRI L-spine with DDD and R S1 impingement. Saw PCP, notes reviewed, as above, with referral for pain management, also has DM2 and HTN, on ASA 325mg. Had 1st right L5/S1 ILESI with 2 days significant relief, still improved, similarly with 2nd and 3rd. Started Norco 5/325mg QID prn, helps but not enough, also 7.5mg QID prn, now 10mg QID prn, helps, has pain with severe weather. Would like to repeat LESIs, had 3.        The following portions of the patient's history were reviewed and updated as appropriate: allergies, current medications, past family history, past medical history, past social history, past surgical history and problem list.    Review of Systems   Constitutional: Negative for chills, fatigue and fever.   HENT: Negative for hearing loss and trouble swallowing.    Eyes: Negative for visual disturbance.   Respiratory: Negative for shortness of breath.    Cardiovascular: Negative for chest pain.   Gastrointestinal: Negative for abdominal pain, constipation, diarrhea, nausea and vomiting.   Genitourinary: Negative for urinary incontinence.   Musculoskeletal: Positive for back pain. Negative for arthralgias, joint swelling, myalgias and neck pain.   Neurological: Positive for numbness and headache. Negative for dizziness and weakness.       Objective   Physical Exam   Constitutional: He is oriented to person, place, and time. He appears well-developed and well-nourished.   HENT:   Head: Normocephalic and atraumatic.   Eyes: Pupils are equal, round, and reactive to light.   Cardiovascular: Normal rate, regular rhythm and normal heart sounds.    Pulmonary/Chest: Breath sounds normal.   Abdominal: Soft. Normal appearance and bowel sounds are normal. He exhibits no distension. There is no abdominal tenderness.   Neurological: He is alert and oriented to person, place, and time. He has normal reflexes. He displays normal reflexes. A sensory deficit is present.   Decreased in b/l stocking distribution       Psychiatric: His behavior is normal. Mood, judgment and thought content normal.         Assessment/Plan   Diagnoses and all orders for this visit:    1. Chronic midline low back pain with sciatica, sciatica laterality unspecified (Primary)    2. Lumbar radiculopathy    3. Other long term (current) drug therapy    4. Sacroiliitis, not elsewhere classified (CMS/HCC)      INspect reviewed, in order. UDS 7/6/20 in order.  Began Percocet 7.5mg QID prn, failed Norco 10/325mg QID prn, failed 7.5/325mg QID prn, failed 5/325mg, NSAIDs, APAP, Tramadol in past. Restarted Norco 10mg QID prn.  Cont Baclofen 10mg TID prn.  Had 3 L5/S1 ILESIs with relief, repeated twice.  Reviewed MRI results.  Cont Gabapentin 600mg TID. Not taking 400mg TID prescribed by Dr. Saldaña.  Treatment plan will consist of continuing current medication as long as it remains effective and is necessary, while evaluating patient at each visit and determining if the medication can be lowered or discontinued, while also using nonopioid therapies to reduce reliance on opioids.  RTC in 3 months for f/u.

## 2021-09-27 ENCOUNTER — OFFICE VISIT (OUTPATIENT)
Dept: PAIN MEDICINE | Facility: CLINIC | Age: 51
End: 2021-09-27

## 2021-09-27 VITALS
SYSTOLIC BLOOD PRESSURE: 118 MMHG | HEART RATE: 73 BPM | DIASTOLIC BLOOD PRESSURE: 67 MMHG | BODY MASS INDEX: 46.65 KG/M2 | WEIGHT: 315 LBS | TEMPERATURE: 97.1 F | RESPIRATION RATE: 16 BRPM | OXYGEN SATURATION: 98 % | HEIGHT: 69 IN

## 2021-09-27 DIAGNOSIS — M51.36 DEGENERATION OF INTERVERTEBRAL DISC OF LUMBAR REGION: ICD-10-CM

## 2021-09-27 DIAGNOSIS — M46.1 SACROILIITIS, NOT ELSEWHERE CLASSIFIED (HCC): ICD-10-CM

## 2021-09-27 DIAGNOSIS — G89.29 CHRONIC MIDLINE LOW BACK PAIN WITH SCIATICA, SCIATICA LATERALITY UNSPECIFIED: Primary | ICD-10-CM

## 2021-09-27 DIAGNOSIS — M54.16 LUMBAR RADICULOPATHY: ICD-10-CM

## 2021-09-27 DIAGNOSIS — Z79.899 OTHER LONG TERM (CURRENT) DRUG THERAPY: ICD-10-CM

## 2021-09-27 DIAGNOSIS — M54.40 CHRONIC MIDLINE LOW BACK PAIN WITH SCIATICA, SCIATICA LATERALITY UNSPECIFIED: Primary | ICD-10-CM

## 2021-09-27 PROCEDURE — G0463 HOSPITAL OUTPT CLINIC VISIT: HCPCS | Performed by: PHYSICAL MEDICINE & REHABILITATION

## 2021-09-27 PROCEDURE — 99214 OFFICE O/P EST MOD 30 MIN: CPT | Performed by: PHYSICAL MEDICINE & REHABILITATION

## 2021-09-27 RX ORDER — HYDROCODONE BITARTRATE AND ACETAMINOPHEN 10; 325 MG/1; MG/1
1 TABLET ORAL EVERY 6 HOURS PRN
Qty: 120 TABLET | Refills: 0 | Status: SHIPPED | OUTPATIENT
Start: 2021-09-27 | End: 2021-12-01 | Stop reason: SDUPTHER

## 2021-09-27 RX ORDER — HYDROCODONE BITARTRATE AND ACETAMINOPHEN 10; 325 MG/1; MG/1
1 TABLET ORAL EVERY 6 HOURS PRN
Qty: 120 TABLET | Refills: 0 | Status: SHIPPED | OUTPATIENT
Start: 2021-09-27 | End: 2022-02-28 | Stop reason: SDUPTHER

## 2021-09-27 NOTE — PROGRESS NOTES
Subjective   Sung Encarnacion is a 51 y.o. male.     Low back pain radiating to BLE, 8/10 at worst, 7/10 at best, 9/10 today, always present, varies, began 2005, worsening, stabbing, tingling, worse with all activity and lying down, interferes with ADLs, sleep, activity, some relief with LESIs and b/l SIJ injections in [place], some with Gabapentin and Tizanidine. MRI L-spine with DDD and R S1 impingement. Saw PCP, notes reviewed, as above, with referral for pain management, also has DM2 and HTN, on ASA 325mg. Had 1st right L5/S1 ILESI with 2 days significant relief, still improved, similarly with 2nd and 3rd. Started Norco 5/325mg QID prn, helps but not enough, also 7.5mg QID prn, now 10mg QID prn, helps, has pain with severe weather. Would like to repeat LESIs, had 3.        The following portions of the patient's history were reviewed and updated as appropriate: allergies, current medications, past family history, past medical history, past social history, past surgical history and problem list.    Review of Systems   Constitutional: Negative for chills, fatigue and fever.   HENT: Negative for hearing loss and trouble swallowing.    Eyes: Negative for visual disturbance.   Respiratory: Negative for shortness of breath.    Cardiovascular: Negative for chest pain.   Gastrointestinal: Negative for abdominal pain, constipation, diarrhea, nausea and vomiting.   Genitourinary: Negative for urinary incontinence.   Musculoskeletal: Positive for back pain. Negative for arthralgias, joint swelling, myalgias and neck pain.   Neurological: Positive for numbness and headache. Negative for dizziness and weakness.       Objective   Physical Exam   Constitutional: He is oriented to person, place, and time. He appears well-developed and well-nourished.   HENT:   Head: Normocephalic and atraumatic.   Eyes: Pupils are equal, round, and reactive to light.   Cardiovascular: Normal rate, regular rhythm and normal heart sounds.    Pulmonary/Chest: Breath sounds normal.   Abdominal: Soft. Normal appearance and bowel sounds are normal. He exhibits no distension. There is no abdominal tenderness.   Neurological: He is alert and oriented to person, place, and time. He has normal reflexes. He displays normal reflexes. A sensory deficit is present.   Decreased in b/l stocking distribution       Psychiatric: His behavior is normal. Mood, judgment and thought content normal.         Assessment/Plan   Diagnoses and all orders for this visit:    1. Chronic midline low back pain with sciatica, sciatica laterality unspecified (Primary)    2. Degeneration of intervertebral disc of lumbar region    3. Lumbar radiculopathy    4. Other long term (current) drug therapy    5. Sacroiliitis, not elsewhere classified (CMS/HCC)      INspect reviewed, in order. UDS 6/28/21 in order.  Began Percocet 7.5mg QID prn, failed Norco 10/325mg QID prn, failed 7.5/325mg QID prn, failed 5/325mg, NSAIDs, APAP, Tramadol in past. Restarted Norco 10mg QID prn.  Cont Baclofen 10mg TID prn.  Had 3 L5/S1 ILESIs with relief, repeated twice, schedule repeat ASAP.  Reviewed MRI results.  Cont Gabapentin 600mg TID. Not taking 400mg TID prescribed by Dr. Saldaña.  Treatment plan will consist of continuing current medication as long as it remains effective and is necessary, while evaluating patient at each visit and determining if the medication can be lowered or discontinued, while also using nonopioid therapies to reduce reliance on opioids.  RTC for 3 LESIs then in 3 months for f/u.

## 2021-10-06 ENCOUNTER — HOSPITAL ENCOUNTER (OUTPATIENT)
Dept: PAIN MEDICINE | Facility: HOSPITAL | Age: 51
Discharge: HOME | End: 2021-10-06

## 2021-10-06 VITALS
WEIGHT: 300 LBS | TEMPERATURE: 98 F | HEART RATE: 76 BPM | RESPIRATION RATE: 16 BRPM | DIASTOLIC BLOOD PRESSURE: 78 MMHG | BODY MASS INDEX: 44.43 KG/M2 | OXYGEN SATURATION: 97 % | SYSTOLIC BLOOD PRESSURE: 136 MMHG | HEIGHT: 69 IN

## 2021-10-06 DIAGNOSIS — R52 PAIN: ICD-10-CM

## 2021-10-06 DIAGNOSIS — G89.29 CHRONIC MIDLINE LOW BACK PAIN WITH SCIATICA, SCIATICA LATERALITY UNSPECIFIED: Primary | ICD-10-CM

## 2021-10-06 DIAGNOSIS — M54.16 LUMBAR RADICULOPATHY: ICD-10-CM

## 2021-10-06 DIAGNOSIS — M54.40 CHRONIC MIDLINE LOW BACK PAIN WITH SCIATICA, SCIATICA LATERALITY UNSPECIFIED: Primary | ICD-10-CM

## 2021-10-06 PROCEDURE — 77003 FLUOROGUIDE FOR SPINE INJECT: CPT

## 2021-10-06 PROCEDURE — 0 IOPAMIDOL 41 % SOLUTION: Performed by: PHYSICAL MEDICINE & REHABILITATION

## 2021-10-06 PROCEDURE — 62323 NJX INTERLAMINAR LMBR/SAC: CPT | Performed by: PHYSICAL MEDICINE & REHABILITATION

## 2021-10-06 PROCEDURE — 25010000002 METHYLPREDNISOLONE PER 80 MG: Performed by: PHYSICAL MEDICINE & REHABILITATION

## 2021-10-06 RX ORDER — METHYLPREDNISOLONE ACETATE 80 MG/ML
80 INJECTION, SUSPENSION INTRA-ARTICULAR; INTRALESIONAL; INTRAMUSCULAR; SOFT TISSUE ONCE
Status: COMPLETED | OUTPATIENT
Start: 2021-10-06 | End: 2021-10-06

## 2021-10-06 RX ADMIN — METHYLPREDNISOLONE ACETATE 80 MG: 80 INJECTION, SUSPENSION INTRA-ARTICULAR; INTRALESIONAL; INTRAMUSCULAR; SOFT TISSUE at 09:46

## 2021-10-06 RX ADMIN — IOPAMIDOL 10 ML: 408 INJECTION, SOLUTION INTRATHECAL at 09:46

## 2021-10-06 NOTE — PROCEDURES
Procedures     Low back pain radiating to BLE, 8/10 at worst, 7/10 at best, 9/10 today, always present, varies, began 2005, worsening, stabbing, tingling, worse with all activity and lying down, interferes with ADLs, sleep, activity, some relief with LESIs and b/l SIJ injections in [place], some with Gabapentin and Tizanidine. MRI L-spine with DDD and R S1 impingement. Saw PCP, notes reviewed, as above, with referral for pain management, also has DM2 and HTN, on ASA 325mg. Had 1st right L5/S1 ILESI with 2 days significant relief, still improved, similarly with 2nd and 3rd. Started Norco 5/325mg QID prn, helps but not enough, also 7.5mg QID prn, now 10mg QID prn, helps, has pain with severe weather. Would like to repeat LESIs, had 3, here for repeat LESIs. Denies allergy to iodine or contrast, anticoagulation, current infection or ABX.       INspect reviewed, in order. UDS 6/28/21 in order.  Began Percocet 7.5mg QID prn, failed Norco 10/325mg QID prn, failed 7.5/325mg QID prn, failed 5/325mg, NSAIDs, APAP, Tramadol in past. Restarted Norco 10mg QID prn.  Cont Baclofen 10mg TID prn.  Had 3 L5/S1 ILESIs with relief, repeated twice, schedule repeat ASAP.  Reviewed MRI results.  Cont Gabapentin 600mg TID. Not taking 400mg TID prescribed by Dr. Saldaña.  Treatment plan will consist of continuing current medication as long as it remains effective and is necessary, while evaluating patient at each visit and determining if the medication can be lowered or discontinued, while also using nonopioid therapies to reduce reliance on opioids.  RTC for LESIs then in 3 months for f/u.          LUMBAR EPIDURAL STEORID INJECTION     PREOPERATIVE DIAGNOSIS: Lumbar radiculopathy     POSTOPERATIVE DIAGNOSIS: Lumbar radiculopathy     PROCEDURE PERFORMED: Lumbar Epidural Steroid Injection     The patient presents with a history of  [ lumbar ] degenerative disc disease with  radiculitis. The patient presents today for a [ lumbar ]   epidural steroid injection at level right L5/S1 This is the [ first  ] procedure. The patient understands the risks and benefits of the procedure and wishes to proceed.  The patient was seen in the preoperative area.  Patient's consent was obtained and updated.  Vitals were taken.  Patient was then brought to the procedure suite and placed in a prone position. The appropriate anatomic area was widely prepped with Chloraprep and draped in a sterile fashion. Under fluoroscopic guidance using [ caudal tilt AP ] view a 20 gauge styleted tuohy needle was passed through skin anesthetized with 1% Lidocaine without epinephrine at 0.2cm from the needle hub.  The needle was advanced using the continuous loss of resistance  technique into the epidural space. Needle tip placement in the epidural space was confirmed by loss of resistance and injection of [ 2 ] mL of preservative free contrast.  Following this [ 4 ] mL of a solution of Depomedrol 80mg and saline 3ml was slowly injected after negative aspiration. A sterile dressing was placed over the puncture site.     The patient tolerated the procedure with [ no complications ]. They were then brought to the post procedure area where they recovered nicely.

## 2021-10-06 NOTE — PATIENT INSTRUCTIONS
Epidural Steroid Injection    An epidural steroid injection is a shot of steroid medicine and numbing medicine that is given into the space between the spinal cord and the bones of the back (epidural space). The shot helps relieve pain caused by an irritated or swollen nerve root.  The amount of pain relief you get from the injection depends on what is causing the nerve to be swollen and irritated, and how long your pain lasts. You are more likely to benefit from this injection if your pain is strong and comes on suddenly rather than if you have had long-term (chronic) pain.  Tell a health care provider about:  · Any allergies you have.  · All medicines you are taking, including vitamins, herbs, eye drops, creams, and over-the-counter medicines.  · Any problems you or family members have had with anesthetic medicines.  · Any blood disorders you have.  · Any surgeries you have had.  · Any medical conditions you have.  · Whether you are pregnant or may be pregnant.  What are the risks?  Generally, this is a safe procedure. However, problems may occur, including:  · Headache.  · Bleeding.  · Infection.  · Allergic reaction to medicines.  · Nerve damage.  What happens before the procedure?  Staying hydrated  Follow instructions from your health care provider about hydration, which may include:  · Up to 2 hours before the procedure - you may continue to drink clear liquids, such as water, clear fruit juice, black coffee, and plain tea.  Eating and drinking restrictions  Follow instructions from your health care provider about eating and drinking, which may include:  · 8 hours before the procedure - stop eating heavy meals or foods, such as meat, fried foods, or fatty foods.  · 6 hours before the procedure - stop eating light meals or foods, such as toast or cereal.  · 6 hours before the procedure - stop drinking milk or drinks that contain milk.  · 2 hours before the procedure - stop drinking clear  liquids.  Medicines  · You may be given medicines to lower anxiety.  · Ask your health care provider about:  ? Changing or stopping your regular medicines. This is especially important if you are taking diabetes medicines or blood thinners.  ? Taking medicines such as aspirin and ibuprofen. These medicines can thin your blood. Do not take these medicines unless your health care provider tells you to take them.  ? Taking over-the-counter medicines, vitamins, herbs, and supplements.  General instructions  · Ask your health care provider what steps will be taken to prevent infection.  · Plan to have a responsible adult take you home from the hospital or clinic.  · If you will be going home right after the procedure, plan to have a responsible adult care for you for the time you are told. This is important.  What happens during the procedure?  · An IV will be inserted into one of your veins.  · You will be given one or more of the following:  ? A medicine to help you relax (sedative).  ? A medicine to numb the area (local anesthetic).  · You will be asked to lie on your abdomen or sit.  · The injection site will be cleaned.  · A needle will be inserted through your skin into the epidural space. This may cause you some discomfort. An X-ray machine will be used to guide the needle as close as possible to the affected nerve.  · A steroid medicine and a local anesthetic will be injected into the epidural space.  · The needle and IV will be removed.  · A bandage (dressing) will be put over the injection site.  The procedure may vary among health care providers and hospitals.  What can I expect after the procedure?  · Your blood pressure, heart rate, breathing rate, and blood oxygen level will be monitored until you leave the hospital or clinic.  · Your arm or leg may feel weak or numb for a few hours.  · The injection site may feel sore.  Follow these instructions at home:  Injection site care  · You may remove the bandage  (dressing) after 24 hours.  · Check your injection site every day for signs of infection. Check for:  ? Redness, swelling, or pain.  ? Fluid or blood.  ? Warmth.  ? Pus or a bad smell.  Managing pain, stiffness, and swelling  · For 24 hours after the procedure:  ? Avoid using heat on the injection site.  ? Do not take baths, swim, or use a hot tub until your health care provider approves. Ask your health care provider if you may take showers. You may only be allowed to take sponge baths.  · If directed, put ice on the injection site. To do this:  ? Put ice in a plastic bag.  ? Place a towel between your skin and the bag.  ? Leave the ice on for 20 minutes, 2-3 times a day.    Activity  · If you were given a sedative during the procedure, it can affect you for several hours. Do not drive or operate machinery until your health care provider says that it is safe.  · Return to your normal activities as told by your health care provider. Ask your health care provider what activities are safe for you.  General instructions  · Take over-the-counter and prescription medicines only as told by your health care provider.  · Drink enough fluid to keep your urine pale yellow.  · Keep all follow-up visits as told by your health care provider. This is important.  Contact a health care provider if:  · You have any of these signs of infection:  ? Redness, swelling, or pain around your injection site.  ? Fluid or blood coming from your injection site.  ? Warmth coming from your injection site.  ? Pus or a bad smell coming from your injection site.  ? A fever.  · You continue to have pain and soreness around the injection site, even after taking over-the-counter pain medicine.  · You have severe, sudden, or lasting nausea or vomiting.  Get help right away if:  · You have severe pain at the injection site that is not relieved by medicines.  · You develop a severe headache or a stiff neck.  · You become sensitive to light.  · You have  any new numbness or weakness in your legs or arms.  · You lose control of your bladder or bowel movements.  · You have trouble breathing.  Summary  · An epidural steroid injection is a shot of steroid medicine and numbing medicine that is given into the epidural space.  · The shot helps relieve pain caused by an irritated or swollen nerve root.  · You are more likely to benefit from this injection if your pain is strong and comes on suddenly rather than if you have had chronic pain.  This information is not intended to replace advice given to you by your health care provider. Make sure you discuss any questions you have with your health care provider.  Document Revised: 04/16/2021 Document Reviewed: 06/29/2020  [employer]vier Patient Education © 2021 [employer]

## 2021-11-03 ENCOUNTER — HOSPITAL ENCOUNTER (OUTPATIENT)
Dept: GENERAL RADIOLOGY | Facility: HOSPITAL | Age: 51
Discharge: HOME | End: 2021-11-03

## 2021-11-03 ENCOUNTER — HOSPITAL ENCOUNTER (OUTPATIENT)
Dept: PAIN MEDICINE | Facility: HOSPITAL | Age: 51
Discharge: HOME | End: 2021-11-03

## 2021-11-03 VITALS
HEART RATE: 82 BPM | OXYGEN SATURATION: 96 % | WEIGHT: 290 LBS | SYSTOLIC BLOOD PRESSURE: 128 MMHG | BODY MASS INDEX: 42.95 KG/M2 | HEIGHT: 69 IN | DIASTOLIC BLOOD PRESSURE: 81 MMHG | RESPIRATION RATE: 16 BRPM

## 2021-11-03 DIAGNOSIS — G89.29 CHRONIC MIDLINE LOW BACK PAIN WITH SCIATICA, SCIATICA LATERALITY UNSPECIFIED: Primary | ICD-10-CM

## 2021-11-03 DIAGNOSIS — M54.50 PAIN OF LUMBAR SPINE: ICD-10-CM

## 2021-11-03 DIAGNOSIS — M54.16 LUMBAR RADICULOPATHY: ICD-10-CM

## 2021-11-03 DIAGNOSIS — M54.40 CHRONIC MIDLINE LOW BACK PAIN WITH SCIATICA, SCIATICA LATERALITY UNSPECIFIED: Primary | ICD-10-CM

## 2021-11-03 PROCEDURE — 62323 NJX INTERLAMINAR LMBR/SAC: CPT | Performed by: PHYSICAL MEDICINE & REHABILITATION

## 2021-11-03 PROCEDURE — 0 IOPAMIDOL 41 % SOLUTION: Performed by: PHYSICAL MEDICINE & REHABILITATION

## 2021-11-03 PROCEDURE — 77003 FLUOROGUIDE FOR SPINE INJECT: CPT

## 2021-11-03 PROCEDURE — 25010000002 METHYLPREDNISOLONE PER 80 MG: Performed by: PHYSICAL MEDICINE & REHABILITATION

## 2021-11-03 RX ORDER — METHYLPREDNISOLONE ACETATE 80 MG/ML
80 INJECTION, SUSPENSION INTRA-ARTICULAR; INTRALESIONAL; INTRAMUSCULAR; SOFT TISSUE ONCE
Status: COMPLETED | OUTPATIENT
Start: 2021-11-03 | End: 2021-11-03

## 2021-11-03 RX ADMIN — IOPAMIDOL 10 ML: 408 INJECTION, SOLUTION INTRATHECAL at 09:50

## 2021-11-03 RX ADMIN — METHYLPREDNISOLONE ACETATE 80 MG: 80 INJECTION, SUSPENSION INTRA-ARTICULAR; INTRALESIONAL; INTRAMUSCULAR; SOFT TISSUE at 09:50

## 2021-11-03 NOTE — PROCEDURES
Procedures       Low back pain radiating to BLE, 8/10 at worst, 7/10 at best, 9/10 today, always present, varies, began 2005, worsening, stabbing, tingling, worse with all activity and lying down, interferes with ADLs, sleep, activity, some relief with LESIs and b/l SIJ injections in [place], some with Gabapentin and Tizanidine. MRI L-spine with DDD and R S1 impingement. Saw PCP, notes reviewed, as above, with referral for pain management, also has DM2 and HTN, on ASA 325mg. Had 1st right L5/S1 ILESI with 2 days significant relief, still improved, similarly with 2nd and 3rd. Started Norco 5/325mg QID prn, helps but not enough, also 7.5mg QID prn, now 10mg QID prn, helps, has pain with severe weather. Would like to repeat LESIs, had 3, here for repeat LESIs. Denies allergy to iodine or contrast, anticoagulation, current infection or ABX.       INspect reviewed, in order. UDS 6/28/21 in order.  Began Percocet 7.5mg QID prn, failed Norco 10/325mg QID prn, failed 7.5/325mg QID prn, failed 5/325mg, NSAIDs, APAP, Tramadol in past. Restarted Norco 10mg QID prn.  Cont Baclofen 10mg TID prn.  Had 3 L5/S1 ILESIs with relief, repeated twice, 2nd of 3 repeat LESIs.  Reviewed MRI results.  Cont Gabapentin 600mg TID. Not taking 400mg TID prescribed by Dr. Saldaña.  Treatment plan will consist of continuing current medication as long as it remains effective and is necessary, while evaluating patient at each visit and determining if the medication can be lowered or discontinued, while also using nonopioid therapies to reduce reliance on opioids.  RTC for LESIs then in 3 months for f/u.          LUMBAR EPIDURAL STEORID INJECTION     PREOPERATIVE DIAGNOSIS: Lumbar radiculopathy     POSTOPERATIVE DIAGNOSIS: Lumbar radiculopathy     PROCEDURE PERFORMED: Lumbar Epidural Steroid Injection     The patient presents with a history of  [ lumbar ] degenerative disc disease with  radiculitis. The patient presents today for a [ lumbar ]   epidural steroid injection at level right L5/S1 This is the [ second ] procedure. The patient understands the risks and benefits of the procedure and wishes to proceed.  The patient was seen in the preoperative area.  Patient's consent was obtained and updated.  Vitals were taken.  Patient was then brought to the procedure suite and placed in a prone position. The appropriate anatomic area was widely prepped with Chloraprep and draped in a sterile fashion. Under fluoroscopic guidance using [ caudal tilt AP ] view a 20 gauge styleted tuohy needle was passed through skin anesthetized with 1% Lidocaine without epinephrine at 0.2cm from the needle hub.  The needle was advanced using the continuous loss of resistance  technique into the epidural space. Needle tip placement in the epidural space was confirmed by loss of resistance and injection of [ 2 ] mL of preservative free contrast.  Following this [ 4 ] mL of a solution of Depomedrol 80mg and saline 3ml was slowly injected after negative aspiration. A sterile dressing was placed over the puncture site.     The patient tolerated the procedure with [ no complications ]. They were then brought to the post procedure area where they recovered nicely.

## 2021-11-29 ENCOUNTER — TELEPHONE (OUTPATIENT)
Dept: PAIN MEDICINE | Facility: CLINIC | Age: 51
End: 2021-11-29

## 2021-11-29 NOTE — TELEPHONE ENCOUNTER
TALKED TO PATIENT..    HE STATED THAT HE HAD 80% PAIN RELIEF FROM LAST TWO LESI    NO IMAGING IN THE LAST YEAR    NO CONSERVATIVE TREATMENT

## 2021-12-01 ENCOUNTER — HOSPITAL ENCOUNTER (OUTPATIENT)
Dept: GENERAL RADIOLOGY | Facility: HOSPITAL | Age: 51
Discharge: HOME | End: 2021-12-01

## 2021-12-01 ENCOUNTER — HOSPITAL ENCOUNTER (OUTPATIENT)
Dept: PAIN MEDICINE | Facility: HOSPITAL | Age: 51
Discharge: HOME | End: 2021-12-01

## 2021-12-01 VITALS
TEMPERATURE: 96.9 F | HEART RATE: 70 BPM | HEIGHT: 69 IN | BODY MASS INDEX: 44.43 KG/M2 | WEIGHT: 300 LBS | RESPIRATION RATE: 20 BRPM | OXYGEN SATURATION: 100 % | SYSTOLIC BLOOD PRESSURE: 131 MMHG | DIASTOLIC BLOOD PRESSURE: 73 MMHG

## 2021-12-01 DIAGNOSIS — R52 PAIN: ICD-10-CM

## 2021-12-01 DIAGNOSIS — M54.16 LUMBAR RADICULOPATHY: ICD-10-CM

## 2021-12-01 DIAGNOSIS — G89.29 CHRONIC MIDLINE LOW BACK PAIN WITH SCIATICA, SCIATICA LATERALITY UNSPECIFIED: Primary | ICD-10-CM

## 2021-12-01 DIAGNOSIS — M54.40 CHRONIC MIDLINE LOW BACK PAIN WITH SCIATICA, SCIATICA LATERALITY UNSPECIFIED: Primary | ICD-10-CM

## 2021-12-01 PROCEDURE — 77003 FLUOROGUIDE FOR SPINE INJECT: CPT

## 2021-12-01 PROCEDURE — 62323 NJX INTERLAMINAR LMBR/SAC: CPT | Performed by: PHYSICAL MEDICINE & REHABILITATION

## 2021-12-01 PROCEDURE — 25010000002 METHYLPREDNISOLONE PER 80 MG: Performed by: PHYSICAL MEDICINE & REHABILITATION

## 2021-12-01 PROCEDURE — 0 IOPAMIDOL 41 % SOLUTION: Performed by: PHYSICAL MEDICINE & REHABILITATION

## 2021-12-01 RX ORDER — METHYLPREDNISOLONE ACETATE 80 MG/ML
80 INJECTION, SUSPENSION INTRA-ARTICULAR; INTRALESIONAL; INTRAMUSCULAR; SOFT TISSUE ONCE
Status: COMPLETED | OUTPATIENT
Start: 2021-12-01 | End: 2021-12-01

## 2021-12-01 RX ORDER — HYDROCODONE BITARTRATE AND ACETAMINOPHEN 10; 325 MG/1; MG/1
1 TABLET ORAL EVERY 6 HOURS PRN
Qty: 120 TABLET | Refills: 0 | Status: SHIPPED | OUTPATIENT
Start: 2021-12-01 | End: 2022-02-28 | Stop reason: SDUPTHER

## 2021-12-01 RX ADMIN — METHYLPREDNISOLONE ACETATE 80 MG: 80 INJECTION, SUSPENSION INTRA-ARTICULAR; INTRALESIONAL; INTRAMUSCULAR; SOFT TISSUE at 09:49

## 2021-12-01 RX ADMIN — IOPAMIDOL 10 ML: 408 INJECTION, SOLUTION INTRATHECAL at 09:49

## 2021-12-01 NOTE — PROCEDURES
Procedures       Low back pain radiating to BLE, 8/10 at worst, 7/10 at best, 9/10 today, always present, varies, began 2005, worsening, stabbing, tingling, worse with all activity and lying down, interferes with ADLs, sleep, activity, some relief with LESIs and b/l SIJ injections in [place], some with Gabapentin and Tizanidine. MRI L-spine with DDD and R S1 impingement. Saw PCP, notes reviewed, as above, with referral for pain management, also has DM2 and HTN, on ASA 325mg. Had 1st right L5/S1 ILESI with 2 days significant relief, still improved, similarly with 2nd and 3rd. Started Norco 5/325mg QID prn, helps but not enough, also 7.5mg QID prn, now 10mg QID prn, helps, has pain with severe weather. Would like to repeat LESIs, had 3, here for repeat LESIs. Denies allergy to iodine or contrast, anticoagulation, current infection or ABX.       INspect reviewed, in order. UDS 6/28/21 in order.  Began Percocet 7.5mg QID prn, failed Norco 10/325mg QID prn, failed 7.5/325mg QID prn, failed 5/325mg, NSAIDs, APAP, Tramadol in past. Restarted Norco 10mg QID prn.  Cont Baclofen 10mg TID prn.  Had 3 L5/S1 ILESIs with relief, repeated twice, 3rd of 3 repeat LESIs.  Reviewed MRI results.  Cont Gabapentin 600mg TID. Not taking 400mg TID prescribed by Dr. Saldaña.  Treatment plan will consist of continuing current medication as long as it remains effective and is necessary, while evaluating patient at each visit and determining if the medication can be lowered or discontinued, while also using nonopioid therapies to reduce reliance on opioids.  RTC in 3 months for f/u.          LUMBAR EPIDURAL STEORID INJECTION     PREOPERATIVE DIAGNOSIS: Lumbar radiculopathy     POSTOPERATIVE DIAGNOSIS: Lumbar radiculopathy     PROCEDURE PERFORMED: Lumbar Epidural Steroid Injection     The patient presents with a history of  [ lumbar ] degenerative disc disease with  radiculitis. The patient presents today for a [ lumbar ]  epidural steroid  injection at level right L5/S1 This is the [ third  ] procedure. The patient understands the risks and benefits of the procedure and wishes to proceed.  The patient was seen in the preoperative area.  Patient's consent was obtained and updated.  Vitals were taken.  Patient was then brought to the procedure suite and placed in a prone position. The appropriate anatomic area was widely prepped with Chloraprep and draped in a sterile fashion. Under fluoroscopic guidance using [ caudal tilt AP ] view a 20 gauge styleted tuohy needle was passed through skin anesthetized with 1% Lidocaine without epinephrine at 0.2cm from the needle hub.  The needle was advanced using the continuous loss of resistance  technique into the epidural space. Needle tip placement in the epidural space was confirmed by loss of resistance and injection of [ 2 ] mL of preservative free contrast.  Following this [ 4 ] mL of a solution of Depomedrol 80mg and saline 3ml was slowly injected after negative aspiration. A sterile dressing was placed over the puncture site.     The patient tolerated the procedure with [ no complications ]. They were then brought to the post procedure area where they recovered nicely.

## 2021-12-27 DIAGNOSIS — M54.16 LUMBAR RADICULOPATHY: ICD-10-CM

## 2021-12-27 RX ORDER — GABAPENTIN 600 MG/1
TABLET ORAL
Qty: 90 TABLET | Refills: 5 | Status: SHIPPED | OUTPATIENT
Start: 2021-12-27 | End: 2022-02-28 | Stop reason: SDUPTHER

## 2022-02-22 RX ORDER — BACLOFEN 10 MG/1
TABLET ORAL
Qty: 90 TABLET | Refills: 11 | Status: SHIPPED | OUTPATIENT
Start: 2022-02-22 | End: 2023-03-27

## 2022-02-28 ENCOUNTER — OFFICE VISIT (OUTPATIENT)
Dept: PAIN MEDICINE | Facility: CLINIC | Age: 52
End: 2022-02-28

## 2022-02-28 VITALS
DIASTOLIC BLOOD PRESSURE: 69 MMHG | HEART RATE: 81 BPM | OXYGEN SATURATION: 98 % | TEMPERATURE: 96.8 F | RESPIRATION RATE: 16 BRPM | SYSTOLIC BLOOD PRESSURE: 118 MMHG | BODY MASS INDEX: 44.3 KG/M2 | HEIGHT: 69 IN

## 2022-02-28 DIAGNOSIS — Z79.899 OTHER LONG TERM (CURRENT) DRUG THERAPY: ICD-10-CM

## 2022-02-28 DIAGNOSIS — M54.40 CHRONIC MIDLINE LOW BACK PAIN WITH SCIATICA, SCIATICA LATERALITY UNSPECIFIED: Primary | ICD-10-CM

## 2022-02-28 DIAGNOSIS — M46.1 SACROILIITIS, NOT ELSEWHERE CLASSIFIED: ICD-10-CM

## 2022-02-28 DIAGNOSIS — M51.36 DEGENERATION OF INTERVERTEBRAL DISC OF LUMBAR REGION: ICD-10-CM

## 2022-02-28 DIAGNOSIS — M54.16 LUMBAR RADICULOPATHY: ICD-10-CM

## 2022-02-28 DIAGNOSIS — G89.29 CHRONIC MIDLINE LOW BACK PAIN WITH SCIATICA, SCIATICA LATERALITY UNSPECIFIED: Primary | ICD-10-CM

## 2022-02-28 PROCEDURE — 99214 OFFICE O/P EST MOD 30 MIN: CPT | Performed by: PHYSICAL MEDICINE & REHABILITATION

## 2022-02-28 PROCEDURE — G0463 HOSPITAL OUTPT CLINIC VISIT: HCPCS | Performed by: PHYSICAL MEDICINE & REHABILITATION

## 2022-02-28 RX ORDER — HYDROCODONE BITARTRATE AND ACETAMINOPHEN 10; 325 MG/1; MG/1
1 TABLET ORAL EVERY 6 HOURS PRN
Qty: 120 TABLET | Refills: 0 | Status: SHIPPED | OUTPATIENT
Start: 2022-02-28 | End: 2022-06-06 | Stop reason: SDUPTHER

## 2022-02-28 RX ORDER — GABAPENTIN 600 MG/1
600 TABLET ORAL 3 TIMES DAILY
Qty: 90 TABLET | Refills: 5 | Status: SHIPPED | OUTPATIENT
Start: 2022-02-28 | End: 2022-12-16

## 2022-02-28 NOTE — PROGRESS NOTES
Subjective   Sung Encarnacion is a 51 y.o. male.     Low back pain radiating to BLE, 8/10 at worst, 7/10 at best, 7/10 today, always present, varies, began 2005, worsening, stabbing, tingling, worse with all activity and lying down, interferes with ADLs, sleep, activity, some relief with LESIs and b/l SIJ injections in [place], some with Gabapentin and Tizanidine. MRI L-spine with DDD and R S1 impingement. Saw PCP, notes reviewed, as above, with referral for pain management, also has DM2 and HTN, on ASA 325mg. Had 1st right L5/S1 ILESI with 2 days significant relief, still improved, similarly with 2nd and 3rd. Started Norco 5/325mg QID prn, helps but not enough, also 7.5mg QID prn, now 10mg QID prn, helps, has pain with severe weather. Would like to repeat LESIs, had 3, then 3 more.        The following portions of the patient's history were reviewed and updated as appropriate: allergies, current medications, past family history, past medical history, past social history, past surgical history and problem list.    Review of Systems   Constitutional: Negative for chills, fatigue and fever.   HENT: Negative for hearing loss and trouble swallowing.    Eyes: Negative for visual disturbance.   Respiratory: Negative for shortness of breath.    Cardiovascular: Negative for chest pain.   Gastrointestinal: Negative for abdominal pain, constipation, diarrhea, nausea and vomiting.   Genitourinary: Negative for urinary incontinence.   Musculoskeletal: Positive for back pain. Negative for arthralgias, joint swelling, myalgias and neck pain.   Neurological: Positive for numbness and headache. Negative for dizziness and weakness.       Objective   Physical Exam   Constitutional: He is oriented to person, place, and time. He appears well-developed and well-nourished.   HENT:   Head: Normocephalic and atraumatic.   Eyes: Pupils are equal, round, and reactive to light.   Cardiovascular: Normal rate, regular rhythm and normal heart  sounds.   Pulmonary/Chest: Breath sounds normal.   Abdominal: Soft. Normal appearance and bowel sounds are normal. He exhibits no distension. There is no abdominal tenderness.   Neurological: He is alert and oriented to person, place, and time. He has normal reflexes. He displays normal reflexes. A sensory deficit is present.   Decreased in b/l stocking distribution       Psychiatric: His behavior is normal. Mood, judgment and thought content normal.         Assessment/Plan   Diagnoses and all orders for this visit:    1. Chronic midline low back pain with sciatica, sciatica laterality unspecified (Primary)    2. Degeneration of intervertebral disc of lumbar region    3. Lumbar radiculopathy    4. Other long term (current) drug therapy    5. Sacroiliitis, not elsewhere classified (HCC)      INspect reviewed, in order. UDS 6/28/21 in order.  Began Percocet 7.5mg QID prn, failed Norco 10/325mg QID prn, failed 7.5/325mg QID prn, failed 5/325mg, NSAIDs, APAP, Tramadol in past. Restarted Norco 10mg QID prn.  Cont Baclofen 10mg TID prn.  Had 3 L5/S1 ILESIs with relief, repeated thrice.  Reviewed MRI results.  Cont Gabapentin 600mg TID. Not taking 400mg TID prescribed by Dr. Saldaña.  Treatment plan will consist of continuing current medication as long as it remains effective and is necessary, while evaluating patient at each visit and determining if the medication can be lowered or discontinued, while also using nonopioid therapies to reduce reliance on opioids.  RTC in 3 months for CHANCE

## 2022-06-06 ENCOUNTER — OFFICE VISIT (OUTPATIENT)
Dept: PAIN MEDICINE | Facility: CLINIC | Age: 52
End: 2022-06-06

## 2022-06-06 VITALS
RESPIRATION RATE: 16 BRPM | WEIGHT: 300 LBS | HEART RATE: 75 BPM | DIASTOLIC BLOOD PRESSURE: 74 MMHG | BODY MASS INDEX: 44.43 KG/M2 | HEIGHT: 69 IN | SYSTOLIC BLOOD PRESSURE: 137 MMHG | OXYGEN SATURATION: 97 % | TEMPERATURE: 97.1 F

## 2022-06-06 DIAGNOSIS — M54.40 CHRONIC MIDLINE LOW BACK PAIN WITH SCIATICA, SCIATICA LATERALITY UNSPECIFIED: ICD-10-CM

## 2022-06-06 DIAGNOSIS — Z79.899 ENCOUNTER FOR LONG-TERM (CURRENT) USE OF OTHER MEDICATIONS: Primary | ICD-10-CM

## 2022-06-06 DIAGNOSIS — Z79.899 OTHER LONG TERM (CURRENT) DRUG THERAPY: ICD-10-CM

## 2022-06-06 DIAGNOSIS — G89.29 CHRONIC MIDLINE LOW BACK PAIN WITH SCIATICA, SCIATICA LATERALITY UNSPECIFIED: ICD-10-CM

## 2022-06-06 DIAGNOSIS — M51.36 DEGENERATION OF INTERVERTEBRAL DISC OF LUMBAR REGION: ICD-10-CM

## 2022-06-06 DIAGNOSIS — M54.16 LUMBAR RADICULOPATHY: ICD-10-CM

## 2022-06-06 DIAGNOSIS — M46.1 SACROILIITIS, NOT ELSEWHERE CLASSIFIED: ICD-10-CM

## 2022-06-06 PROCEDURE — G0463 HOSPITAL OUTPT CLINIC VISIT: HCPCS | Performed by: PHYSICAL MEDICINE & REHABILITATION

## 2022-06-06 PROCEDURE — 99214 OFFICE O/P EST MOD 30 MIN: CPT | Performed by: PHYSICAL MEDICINE & REHABILITATION

## 2022-06-06 RX ORDER — HYDROCODONE BITARTRATE AND ACETAMINOPHEN 10; 325 MG/1; MG/1
1 TABLET ORAL EVERY 6 HOURS PRN
Qty: 120 TABLET | Refills: 0 | Status: SHIPPED | OUTPATIENT
Start: 2022-06-06 | End: 2022-09-08

## 2022-06-06 RX ORDER — HYDROCODONE BITARTRATE AND ACETAMINOPHEN 10; 325 MG/1; MG/1
1 TABLET ORAL EVERY 6 HOURS PRN
Qty: 120 TABLET | Refills: 0 | Status: SHIPPED | OUTPATIENT
Start: 2022-06-06 | End: 2022-10-03 | Stop reason: SDUPTHER

## 2022-06-06 NOTE — PROGRESS NOTES
Subjective   Sung Encarnacion is a 51 y.o. male.     Low back pain radiating to BLE, 8/10 at worst, 7/10 at best, 7/10 today, always present, varies, began 2005, worsening, stabbing, tingling, worse with all activity and lying down, interferes with ADLs, sleep, activity, some relief with LESIs and b/l SIJ injections in [place], some with Gabapentin and Tizanidine. MRI L-spine with DDD and R S1 impingement. Saw PCP, notes reviewed, as above, with referral for pain management, also has DM2 and HTN, on ASA 325mg. Had 1st right L5/S1 ILESI with 2 days significant relief, still improved, similarly with 2nd and 3rd. Started Norco 5/325mg QID prn, helps but not enough, also 7.5mg QID prn, now 10mg QID prn, helps, has pain with severe weather. Would like to repeat LESIs, had 3, then 3 more.        The following portions of the patient's history were reviewed and updated as appropriate: allergies, current medications, past family history, past medical history, past social history, past surgical history and problem list.    Review of Systems   Constitutional: Negative for chills, fatigue and fever.   HENT: Negative for hearing loss and trouble swallowing.    Eyes: Negative for visual disturbance.   Respiratory: Negative for shortness of breath.    Cardiovascular: Negative for chest pain.   Gastrointestinal: Negative for abdominal pain, constipation, diarrhea, nausea and vomiting.   Genitourinary: Negative for urinary incontinence.   Musculoskeletal: Positive for back pain. Negative for arthralgias, joint swelling, myalgias and neck pain.   Neurological: Positive for numbness and headache. Negative for dizziness and weakness.       Objective   Physical Exam   Constitutional: He is oriented to person, place, and time. He appears well-developed and well-nourished.   HENT:   Head: Normocephalic and atraumatic.   Eyes: Pupils are equal, round, and reactive to light.   Cardiovascular: Normal rate, regular rhythm and normal heart  sounds.   Pulmonary/Chest: Breath sounds normal.   Abdominal: Soft. Normal appearance and bowel sounds are normal. He exhibits no distension. There is no abdominal tenderness.   Neurological: He is alert and oriented to person, place, and time. He has normal reflexes. He displays normal reflexes. A sensory deficit is present.   Decreased in b/l stocking distribution       Psychiatric: His behavior is normal. Mood, judgment and thought content normal.         Assessment & Plan   Diagnoses and all orders for this visit:    1. Chronic midline low back pain with sciatica, sciatica laterality unspecified (Primary)    2. Degeneration of intervertebral disc of lumbar region    3. Lumbar radiculopathy    4. Other long term (current) drug therapy    5. Sacroiliitis, not elsewhere classified (HCC)      INspect reviewed, in order. UDS 6/28/21 in order.  Began Percocet 7.5mg QID prn, failed Norco 10/325mg QID prn, failed 7.5/325mg QID prn, failed 5/325mg, NSAIDs, APAP, Tramadol in past. Restarted Norco 10mg QID prn.  Cont Baclofen 10mg TID prn.  Had 3 L5/S1 ILESIs with relief, repeated thrice. Plan an LESI q 3 monthly.  Reviewed MRI results.  Cont Gabapentin 600mg TID. Not taking 400mg TID prescribed by Dr. Saldaña.  Treatment plan will consist of continuing current medication as long as it remains effective and is necessary, while evaluating patient at each visit and determining if the medication can be lowered or discontinued, while also using nonopioid therapies to reduce reliance on opioids.  RTC for LESI then in 3 months for LESI

## 2022-06-15 ENCOUNTER — HOSPITAL ENCOUNTER (OUTPATIENT)
Dept: GENERAL RADIOLOGY | Facility: HOSPITAL | Age: 52
Discharge: HOME | End: 2022-06-15

## 2022-06-15 ENCOUNTER — HOSPITAL ENCOUNTER (OUTPATIENT)
Dept: PAIN MEDICINE | Facility: HOSPITAL | Age: 52
Discharge: HOME | End: 2022-06-15

## 2022-06-15 VITALS
HEART RATE: 76 BPM | BODY MASS INDEX: 44.43 KG/M2 | DIASTOLIC BLOOD PRESSURE: 78 MMHG | HEIGHT: 69 IN | TEMPERATURE: 97.1 F | RESPIRATION RATE: 16 BRPM | SYSTOLIC BLOOD PRESSURE: 130 MMHG | WEIGHT: 300 LBS | OXYGEN SATURATION: 97 %

## 2022-06-15 DIAGNOSIS — R52 PAIN: ICD-10-CM

## 2022-06-15 DIAGNOSIS — M54.40 CHRONIC MIDLINE LOW BACK PAIN WITH SCIATICA, SCIATICA LATERALITY UNSPECIFIED: Primary | ICD-10-CM

## 2022-06-15 DIAGNOSIS — M54.16 LUMBAR RADICULOPATHY: ICD-10-CM

## 2022-06-15 DIAGNOSIS — G89.29 CHRONIC MIDLINE LOW BACK PAIN WITH SCIATICA, SCIATICA LATERALITY UNSPECIFIED: Primary | ICD-10-CM

## 2022-06-15 PROCEDURE — 25010000002 METHYLPREDNISOLONE PER 80 MG: Performed by: PHYSICAL MEDICINE & REHABILITATION

## 2022-06-15 PROCEDURE — 0 IOPAMIDOL 41 % SOLUTION: Performed by: PHYSICAL MEDICINE & REHABILITATION

## 2022-06-15 PROCEDURE — 77003 FLUOROGUIDE FOR SPINE INJECT: CPT

## 2022-06-15 PROCEDURE — 62323 NJX INTERLAMINAR LMBR/SAC: CPT | Performed by: PHYSICAL MEDICINE & REHABILITATION

## 2022-06-15 RX ORDER — METHYLPREDNISOLONE ACETATE 80 MG/ML
80 INJECTION, SUSPENSION INTRA-ARTICULAR; INTRALESIONAL; INTRAMUSCULAR; SOFT TISSUE ONCE
Status: COMPLETED | OUTPATIENT
Start: 2022-06-15 | End: 2022-06-15

## 2022-06-15 RX ADMIN — IOPAMIDOL 10 ML: 408 INJECTION, SOLUTION INTRATHECAL at 08:15

## 2022-06-15 RX ADMIN — METHYLPREDNISOLONE ACETATE 80 MG: 80 INJECTION, SUSPENSION INTRA-ARTICULAR; INTRALESIONAL; INTRAMUSCULAR; SOFT TISSUE at 08:16

## 2022-06-15 NOTE — PROCEDURES
Procedures     Low back pain radiating to BLE, 8/10 at worst, 7/10 at best, 7/10 today, always present, varies, began 2005, worsening, stabbing, tingling, worse with all activity and lying down, interferes with ADLs, sleep, activity, some relief with LESIs and b/l SIJ injections in [place], some with Gabapentin and Tizanidine. MRI L-spine with DDD and R S1 impingement. Saw PCP, notes reviewed, as above, with referral for pain management, also has DM2 and HTN, on ASA 325mg. Had 1st right L5/S1 ILESI with 2 days significant relief, still improved, similarly with 2nd and 3rd. Started Norco 5/325mg QID prn, helps but not enough, also 7.5mg QID prn, now 10mg QID prn, helps, has pain with severe weather. Would like to repeat LESIs, had 3, then 3 more.     INspect reviewed, in order. UDS 6/28/21 in order.  Began Percocet 7.5mg QID prn, failed Norco 10/325mg QID prn, failed 7.5/325mg QID prn, failed 5/325mg, NSAIDs, APAP, Tramadol in past. Restarted Norco 10mg QID prn.  Cont Baclofen 10mg TID prn.  Had 3 L5/S1 ILESIs with relief, repeated thrice. Plan an LESI q 3 monthly.  Reviewed MRI results.  Cont Gabapentin 600mg TID. Not taking 400mg TID prescribed by Dr. Saldaña.  Treatment plan will consist of continuing current medication as long as it remains effective and is necessary, while evaluating patient at each visit and determining if the medication can be lowered or discontinued, while also using nonopioid therapies to reduce reliance on opioids.  RTC in 3 months for LESI      LUMBAR EPIDURAL STEORID INJECTION     PREOPERATIVE DIAGNOSIS: Lumbar radiculopathy     POSTOPERATIVE DIAGNOSIS: Lumbar radiculopathy     PROCEDURE PERFORMED: Lumbar Epidural Steroid Injection     The patient presents with a history of  [ lumbar ] degenerative disc disease with  radiculitis. The patient presents today for a [ lumbar ]  epidural steroid injection at level right L5/S1. The patient understands the risks and benefits of the procedure  and wishes to proceed.  The patient was seen in the preoperative area.  Patient's consent was obtained and updated.  Vitals were taken.  Patient was then brought to the procedure suite and placed in a prone position. The appropriate anatomic area was widely prepped with Chloraprep and draped in a sterile fashion. Under fluoroscopic guidance using [ caudal tilt AP ] view a 20 gauge styleted tuohy needle was passed through skin anesthetized with 1% Lidocaine without epinephrine at 0.3cm from the needle hub.  The needle was advanced using the continuous loss of resistance  technique into the epidural space. Needle tip placement in the epidural space was confirmed by loss of resistance and injection of [ 2 ] mL of preservative free contrast.  Following this [ 4 ] mL of a solution of Depomedrol 80mg and saline 3ml was slowly injected after negative aspiration. A sterile dressing was placed over the puncture site.     The patient tolerated the procedure with [ no complications ]. They were then brought to the post procedure area where they recovered nicely.

## 2022-09-08 DIAGNOSIS — M54.40 CHRONIC MIDLINE LOW BACK PAIN WITH SCIATICA, SCIATICA LATERALITY UNSPECIFIED: ICD-10-CM

## 2022-09-08 DIAGNOSIS — G89.29 CHRONIC MIDLINE LOW BACK PAIN WITH SCIATICA, SCIATICA LATERALITY UNSPECIFIED: ICD-10-CM

## 2022-09-08 RX ORDER — HYDROCODONE BITARTRATE AND ACETAMINOPHEN 10; 325 MG/1; MG/1
TABLET ORAL
Qty: 120 TABLET | Refills: 0 | Status: SHIPPED | OUTPATIENT
Start: 2022-09-08 | End: 2022-10-03 | Stop reason: SDUPTHER

## 2022-09-27 ENCOUNTER — TELEPHONE (OUTPATIENT)
Dept: PAIN MEDICINE | Facility: CLINIC | Age: 52
End: 2022-09-27

## 2022-09-27 DIAGNOSIS — G89.29 CHRONIC MIDLINE LOW BACK PAIN WITH SCIATICA, SCIATICA LATERALITY UNSPECIFIED: ICD-10-CM

## 2022-09-27 DIAGNOSIS — M54.40 CHRONIC MIDLINE LOW BACK PAIN WITH SCIATICA, SCIATICA LATERALITY UNSPECIFIED: ICD-10-CM

## 2022-09-27 NOTE — TELEPHONE ENCOUNTER
Caller: Shashank Sung    Relationship: Self    Best call back number: [phone]    Requested Prescriptions:   Requested Prescriptions     Pending Prescriptions Disp Refills   • HYDROcodone-acetaminophen (NORCO)  MG per tablet 120 tablet 0     Sig: Take 1 tablet by mouth Every 6 (Six) Hours As Needed for Moderate Pain.        Pharmacy where request should be sent:  [facility]    Additional details provided by patient: PATIENT HAD PROCEDURE SCHEDULED FOR TOMORROW BUT IT WAS PUSHED DUE TO INSURANCE-- PATIENT STATED HE GETS THE MEDICATIONS REFILLED AT THESE APPTS. R/S FOR 10.12.22 AND HE WILL BE OUT BEFORE THEN    OFFICE PLEASE ADVISE PATIENT ON SCHEDULING.T Y    Does the patient have less than a 3 day supply:  [] Yes  [] No    Gayle Jasso   09/27/22 14:33 EDT

## 2022-09-28 RX ORDER — HYDROCODONE BITARTRATE AND ACETAMINOPHEN 10; 325 MG/1; MG/1
1 TABLET ORAL EVERY 6 HOURS PRN
Qty: 120 TABLET | Refills: 0 | Status: CANCELLED | OUTPATIENT
Start: 2022-09-28

## 2022-09-28 NOTE — TELEPHONE ENCOUNTER
It's 2 weeks past 3 months since he was last seen. It's already too late to send in a refill until he is seen. Can we make a f/u appt ASAP?

## 2022-09-28 NOTE — TELEPHONE ENCOUNTER
Called pt- no answer. Left vm to call back to get on the schedule for an office visit. Please transfer to [facility] @  in [place].

## 2022-10-03 ENCOUNTER — OFFICE VISIT (OUTPATIENT)
Dept: PAIN MEDICINE | Facility: CLINIC | Age: 52
End: 2022-10-03

## 2022-10-03 VITALS
SYSTOLIC BLOOD PRESSURE: 151 MMHG | DIASTOLIC BLOOD PRESSURE: 86 MMHG | BODY MASS INDEX: 44.43 KG/M2 | RESPIRATION RATE: 16 BRPM | HEIGHT: 69 IN | OXYGEN SATURATION: 96 % | HEART RATE: 77 BPM | WEIGHT: 300 LBS

## 2022-10-03 DIAGNOSIS — M54.40 CHRONIC MIDLINE LOW BACK PAIN WITH SCIATICA, SCIATICA LATERALITY UNSPECIFIED: Primary | ICD-10-CM

## 2022-10-03 DIAGNOSIS — Z79.899 OTHER LONG TERM (CURRENT) DRUG THERAPY: ICD-10-CM

## 2022-10-03 DIAGNOSIS — G89.29 CHRONIC MIDLINE LOW BACK PAIN WITH SCIATICA, SCIATICA LATERALITY UNSPECIFIED: Primary | ICD-10-CM

## 2022-10-03 DIAGNOSIS — M54.16 LUMBAR RADICULOPATHY: ICD-10-CM

## 2022-10-03 DIAGNOSIS — M46.1 SACROILIITIS, NOT ELSEWHERE CLASSIFIED: ICD-10-CM

## 2022-10-03 DIAGNOSIS — M51.36 DEGENERATION OF INTERVERTEBRAL DISC OF LUMBAR REGION: ICD-10-CM

## 2022-10-03 PROCEDURE — 99214 OFFICE O/P EST MOD 30 MIN: CPT | Performed by: PHYSICAL MEDICINE & REHABILITATION

## 2022-10-03 PROCEDURE — G0463 HOSPITAL OUTPT CLINIC VISIT: HCPCS | Performed by: PHYSICAL MEDICINE & REHABILITATION

## 2022-10-03 RX ORDER — HYDROCODONE BITARTRATE AND ACETAMINOPHEN 10; 325 MG/1; MG/1
1 TABLET ORAL EVERY 6 HOURS PRN
Qty: 120 TABLET | Refills: 0 | Status: SHIPPED | OUTPATIENT
Start: 2022-10-03 | End: 2023-01-11

## 2022-10-03 RX ORDER — HYDROCODONE BITARTRATE AND ACETAMINOPHEN 10; 325 MG/1; MG/1
1 TABLET ORAL EVERY 6 HOURS PRN
Qty: 120 TABLET | Refills: 0 | Status: SHIPPED | OUTPATIENT
Start: 2022-10-03 | End: 2023-01-18 | Stop reason: SDUPTHER

## 2022-10-03 NOTE — PROGRESS NOTES
Subjective   Sung Encarnacion is a 52 y.o. male.     History of Present Illness  Low back pain radiating to BLE, 8/10 at worst, 7/10 at best, 7/10 today, always present, varies, began 2005, worsening, stabbing, tingling, worse with all activity and lying down, interferes with ADLs, sleep, activity, some relief with LESIs and b/l SIJ injections in [place], some with Gabapentin and Tizanidine. MRI L-spine with DDD and R S1 impingement. Saw PCP, notes reviewed, as above, with referral for pain management, also has DM2 and HTN, on ASA 325mg. Had 1st right L5/S1 ILESI with 2 days significant relief, still improved, similarly with 2nd and 3rd. Started Norco 5/325mg QID prn, helps but not enough, also 7.5mg QID prn, now 10mg QID prn, helps, has pain with severe weather. Would like to repeat LESIs, had 3, then 3 more.        The following portions of the patient's history were reviewed and updated as appropriate: allergies, current medications, past family history, past medical history, past social history, past surgical history and problem list.    Review of Systems   Constitutional: Negative for chills, fatigue and fever.   HENT: Negative for hearing loss and trouble swallowing.    Eyes: Negative for visual disturbance.   Respiratory: Negative for shortness of breath.    Cardiovascular: Negative for chest pain.   Gastrointestinal: Negative for abdominal pain, constipation, diarrhea, nausea and vomiting.   Genitourinary: Negative for urinary incontinence.   Musculoskeletal: Positive for back pain. Negative for arthralgias, joint swelling, myalgias and neck pain.   Neurological: Positive for numbness and headache. Negative for dizziness and weakness.       Objective   Physical Exam   Constitutional: He is oriented to person, place, and time. He appears well-developed and well-nourished.   HENT:   Head: Normocephalic and atraumatic.   Eyes: Pupils are equal, round, and reactive to light.   Cardiovascular: Normal rate,  regular rhythm and normal heart sounds.   Pulmonary/Chest: Breath sounds normal.   Abdominal: Soft. Normal appearance and bowel sounds are normal. He exhibits no distension. There is no abdominal tenderness.   Neurological: He is alert and oriented to person, place, and time. He has normal reflexes. He displays normal reflexes. A sensory deficit is present.   Decreased in b/l stocking distribution       Psychiatric: His behavior is normal. Mood, judgment and thought content normal.         Assessment & Plan   Diagnoses and all orders for this visit:    1. Chronic midline low back pain with sciatica, sciatica laterality unspecified (Primary)    2. Lumbar radiculopathy    3. Other long term (current) drug therapy    4. Sacroiliitis, not elsewhere classified (HCC)    5. Degeneration of intervertebral disc of lumbar region      INspect reviewed, in order. UDS 6/6/22 in order.  Began Percocet 7.5mg QID prn, failed Norco 10/325mg QID prn, failed 7.5/325mg QID prn, failed 5/325mg, NSAIDs, APAP, Tramadol in past. Restarted Norco 10mg QID prn.  Cont Baclofen 10mg TID prn.  Had 3 L5/S1 ILESIs with relief, repeated thrice. Plan an LESI q 3 monthly.  Reviewed MRI results.  Cont Gabapentin 600mg TID. Not taking 400mg TID prescribed by Dr. Saldaña.  Treatment plan will consist of continuing current medication as long as it remains effective and is necessary, while evaluating patient at each visit and determining if the medication can be lowered or discontinued, while also using nonopioid therapies to reduce reliance on opioids.  RTC for LESI then in 3 months for LESI

## 2022-10-12 ENCOUNTER — HOSPITAL ENCOUNTER (OUTPATIENT)
Dept: PAIN MEDICINE | Facility: HOSPITAL | Age: 52
Discharge: HOME | End: 2022-10-12

## 2022-10-12 ENCOUNTER — HOSPITAL ENCOUNTER (OUTPATIENT)
Dept: GENERAL RADIOLOGY | Facility: HOSPITAL | Age: 52
Discharge: HOME | End: 2022-10-12

## 2022-10-12 VITALS
HEART RATE: 81 BPM | RESPIRATION RATE: 16 BRPM | OXYGEN SATURATION: 98 % | DIASTOLIC BLOOD PRESSURE: 81 MMHG | TEMPERATURE: 97.9 F | SYSTOLIC BLOOD PRESSURE: 142 MMHG

## 2022-10-12 DIAGNOSIS — M54.40 CHRONIC MIDLINE LOW BACK PAIN WITH SCIATICA, SCIATICA LATERALITY UNSPECIFIED: Primary | ICD-10-CM

## 2022-10-12 DIAGNOSIS — R52 PAIN: ICD-10-CM

## 2022-10-12 DIAGNOSIS — M46.1 SACROILIITIS, NOT ELSEWHERE CLASSIFIED: ICD-10-CM

## 2022-10-12 DIAGNOSIS — G89.29 CHRONIC MIDLINE LOW BACK PAIN WITH SCIATICA, SCIATICA LATERALITY UNSPECIFIED: Primary | ICD-10-CM

## 2022-10-12 DIAGNOSIS — M51.36 DEGENERATION OF INTERVERTEBRAL DISC OF LUMBAR REGION: ICD-10-CM

## 2022-10-12 DIAGNOSIS — M54.16 LUMBAR RADICULOPATHY: ICD-10-CM

## 2022-10-12 PROCEDURE — 77003 FLUOROGUIDE FOR SPINE INJECT: CPT

## 2022-10-12 PROCEDURE — 62323 NJX INTERLAMINAR LMBR/SAC: CPT | Performed by: PHYSICAL MEDICINE & REHABILITATION

## 2022-10-12 PROCEDURE — 25010000002 METHYLPREDNISOLONE PER 80 MG: Performed by: PHYSICAL MEDICINE & REHABILITATION

## 2022-10-12 PROCEDURE — 0 IOPAMIDOL 41 % SOLUTION: Performed by: PHYSICAL MEDICINE & REHABILITATION

## 2022-10-12 RX ORDER — METHYLPREDNISOLONE ACETATE 80 MG/ML
80 INJECTION, SUSPENSION INTRA-ARTICULAR; INTRALESIONAL; INTRAMUSCULAR; SOFT TISSUE ONCE
Status: COMPLETED | OUTPATIENT
Start: 2022-10-12 | End: 2022-10-12

## 2022-10-12 RX ADMIN — METHYLPREDNISOLONE ACETATE 80 MG: 80 INJECTION, SUSPENSION INTRA-ARTICULAR; INTRALESIONAL; INTRAMUSCULAR; SOFT TISSUE at 08:47

## 2022-10-12 RX ADMIN — IOPAMIDOL 10 ML: 408 INJECTION, SOLUTION INTRATHECAL at 08:46

## 2022-10-12 NOTE — PROCEDURES
Procedures     History of Present Illness  Low back pain radiating to BLE, 8/10 at worst, 7/10 at best, 7/10 today, always present, varies, began 2005, worsening, stabbing, tingling, worse with all activity and lying down, interferes with ADLs, sleep, activity, some relief with LESIs and b/l SIJ injections in [place], some with Gabapentin and Tizanidine. MRI L-spine with DDD and R S1 impingement. Saw PCP, notes reviewed, as above, with referral for pain management, also has DM2 and HTN, on ASA 325mg. Had 1st right L5/S1 ILESI with 2 days significant relief, still improved, similarly with 2nd and 3rd. Started Norco 5/325mg QID prn, helps but not enough, also 7.5mg QID prn, now 10mg QID prn, helps, has pain with severe weather. Would like to repeat LESIs, had 3, then 3 more.     INspect reviewed, in order. UDS 6/6/22 in order.  Began Percocet 7.5mg QID prn, failed Norco 10/325mg QID prn, failed 7.5/325mg QID prn, failed 5/325mg, NSAIDs, APAP, Tramadol in past. Restarted Norco 10mg QID prn.  Cont Baclofen 10mg TID prn.  Had 3 L5/S1 ILESIs with relief, repeated thrice. Plan an LESI q 3 monthly.  Reviewed MRI results.  Cont Gabapentin 600mg TID. Not taking 400mg TID prescribed by Dr. Saldaña.  Treatment plan will consist of continuing current medication as long as it remains effective and is necessary, while evaluating patient at each visit and determining if the medication can be lowered or discontinued, while also using nonopioid therapies to reduce reliance on opioids.  RTC in 3 months for LESI      LUMBAR EPIDURAL STEORID INJECTION      PREOPERATIVE DIAGNOSIS: Lumbar radiculopathy     POSTOPERATIVE DIAGNOSIS: Lumbar radiculopathy     PROCEDURE PERFORMED: Lumbar Epidural Steroid Injection     The patient presents with a history of  [ lumbar ] degenerative disc disease with  radiculitis. The patient presents today for a [ lumbar ]  epidural steroid injection at level right L5/S1. The patient understands the risks and  benefits of the procedure and wishes to proceed.  The patient was seen in the preoperative area.  Patient's consent was obtained and updated.  Vitals were taken.  Patient was then brought to the procedure suite and placed in a prone position. The appropriate anatomic area was widely prepped with Chloraprep and draped in a sterile fashion. Under fluoroscopic guidance using [ caudal tilt AP ] view a 20 gauge styleted tuohy needle was passed through skin anesthetized with 1% Lidocaine without epinephrine at 0.3cm from the needle hub.  The needle was advanced using the continuous loss of resistance  technique into the epidural space. Needle tip placement in the epidural space was confirmed by loss of resistance and injection of [ 2 ] mL of preservative free contrast.  Following this [ 4 ] mL of a solution of Depomedrol 80mg and saline 3ml was slowly injected after negative aspiration. A sterile dressing was placed over the puncture site.     The patient tolerated the procedure with [ no complications ]. They were then brought to the post procedure area where they recovered nicely.

## 2022-12-16 DIAGNOSIS — M54.16 LUMBAR RADICULOPATHY: ICD-10-CM

## 2022-12-16 RX ORDER — GABAPENTIN 600 MG/1
TABLET ORAL
Qty: 90 TABLET | Refills: 5 | Status: SHIPPED | OUTPATIENT
Start: 2022-12-16

## 2022-12-16 NOTE — TELEPHONE ENCOUNTER
Rx Refill Note  Requested Prescriptions     Pending Prescriptions Disp Refills   • gabapentin (NEURONTIN) 600 MG tablet [Pharmacy Med Name: GABAPENTIN 600MG TABS] 90 tablet 5     Sig: TAKE ONE TABLET BY MOUTH THREE TIMES A DAY      Last office visit with prescribing clinician: 10/3/2022   Last telemedicine visit with prescribing clinician: Visit date not found   Next office visit with prescribing clinician: Visit date not found                         Would you like a call back once the refill request has been completed: [] Yes [] No    If the office needs to give you a call back, can they leave a voicemail: [] Yes [] No    Rosario Angeles MA  12/16/22, 15:41 EST

## 2023-01-10 DIAGNOSIS — M54.40 CHRONIC MIDLINE LOW BACK PAIN WITH SCIATICA, SCIATICA LATERALITY UNSPECIFIED: ICD-10-CM

## 2023-01-10 DIAGNOSIS — G89.29 CHRONIC MIDLINE LOW BACK PAIN WITH SCIATICA, SCIATICA LATERALITY UNSPECIFIED: ICD-10-CM

## 2023-01-10 NOTE — TELEPHONE ENCOUNTER
Rx Refill Note  Requested Prescriptions     Pending Prescriptions Disp Refills   • HYDROcodone-acetaminophen (NORCO)  MG per tablet [Pharmacy Med Name: HYDROCODONE-ACETAMINOPH  TABS] 120 tablet 0     Sig: TAKE ONE TABLET BY MOUTH EVERY 6 HOURS AS NEEDED FOR MODERATE PAIN      Last office visit with prescribing clinician: 10/3/2022   Last telemedicine visit with prescribing clinician: Visit date not found   Next office visit with prescribing clinician: Visit date not found                         Would you like a call back once the refill request has been completed: [] Yes [] No    If the office needs to give you a call back, can they leave a voicemail: [] Yes [] No    Rosario Angeles MA  01/10/23, 15:42 EST

## 2023-01-11 RX ORDER — HYDROCODONE BITARTRATE AND ACETAMINOPHEN 10; 325 MG/1; MG/1
TABLET ORAL
Qty: 120 TABLET | Refills: 0 | Status: SHIPPED | OUTPATIENT
Start: 2023-01-11 | End: 2023-01-18 | Stop reason: SDUPTHER

## 2023-01-18 ENCOUNTER — HOSPITAL ENCOUNTER (OUTPATIENT)
Dept: PAIN MEDICINE | Facility: HOSPITAL | Age: 53
Discharge: HOME | End: 2023-01-18
Payer: MEDICARE

## 2023-01-18 ENCOUNTER — HOSPITAL ENCOUNTER (OUTPATIENT)
Dept: GENERAL RADIOLOGY | Facility: HOSPITAL | Age: 53
Discharge: HOME | End: 2023-01-18
Payer: MEDICARE

## 2023-01-18 VITALS
TEMPERATURE: 96.9 F | DIASTOLIC BLOOD PRESSURE: 71 MMHG | OXYGEN SATURATION: 98 % | RESPIRATION RATE: 16 BRPM | HEART RATE: 74 BPM | SYSTOLIC BLOOD PRESSURE: 129 MMHG

## 2023-01-18 DIAGNOSIS — M54.40 CHRONIC MIDLINE LOW BACK PAIN WITH SCIATICA, SCIATICA LATERALITY UNSPECIFIED: Primary | ICD-10-CM

## 2023-01-18 DIAGNOSIS — G89.29 CHRONIC MIDLINE LOW BACK PAIN WITH SCIATICA, SCIATICA LATERALITY UNSPECIFIED: Primary | ICD-10-CM

## 2023-01-18 DIAGNOSIS — R52 PAIN: ICD-10-CM

## 2023-01-18 DIAGNOSIS — M54.16 LUMBAR RADICULOPATHY: ICD-10-CM

## 2023-01-18 PROCEDURE — 25010000002 METHYLPREDNISOLONE PER 80 MG: Performed by: PHYSICAL MEDICINE & REHABILITATION

## 2023-01-18 PROCEDURE — 0 IOPAMIDOL 41 % SOLUTION: Performed by: PHYSICAL MEDICINE & REHABILITATION

## 2023-01-18 PROCEDURE — 62323 NJX INTERLAMINAR LMBR/SAC: CPT | Performed by: PHYSICAL MEDICINE & REHABILITATION

## 2023-01-18 PROCEDURE — 77003 FLUOROGUIDE FOR SPINE INJECT: CPT

## 2023-01-18 RX ORDER — HYDROCODONE BITARTRATE AND ACETAMINOPHEN 10; 325 MG/1; MG/1
1 TABLET ORAL EVERY 6 HOURS PRN
Qty: 120 TABLET | Refills: 0 | Status: SHIPPED | OUTPATIENT
Start: 2023-01-18

## 2023-01-18 RX ORDER — METHYLPREDNISOLONE ACETATE 80 MG/ML
80 INJECTION, SUSPENSION INTRA-ARTICULAR; INTRALESIONAL; INTRAMUSCULAR; SOFT TISSUE ONCE
Status: COMPLETED | OUTPATIENT
Start: 2023-01-18 | End: 2023-01-18

## 2023-01-18 RX ADMIN — IOPAMIDOL 10 ML: 408 INJECTION, SOLUTION INTRATHECAL at 08:24

## 2023-01-18 RX ADMIN — METHYLPREDNISOLONE ACETATE 80 MG: 80 INJECTION, SUSPENSION INTRA-ARTICULAR; INTRALESIONAL; INTRAMUSCULAR; SOFT TISSUE at 08:25

## 2023-01-18 NOTE — PROCEDURES
Procedures       History of Present Illness  Low back pain radiating to BLE, 8/10 at worst, 7/10 at best, 7/10 today, always present, varies, began 2005, worsening, stabbing, tingling, worse with all activity and lying down, interferes with ADLs, sleep, activity, some relief with LESIs and b/l SIJ injections in [place], some with Gabapentin and Tizanidine. MRI L-spine with DDD and R S1 impingement. Saw PCP, notes reviewed, as above, with referral for pain management, also has DM2 and HTN, on ASA 325mg. Had 1st right L5/S1 ILESI with 2 days significant relief, still improved, similarly with 2nd and 3rd. Started Norco 5/325mg QID prn, helps but not enough, also 7.5mg QID prn, now 10mg QID prn, helps, has pain with severe weather. Would like to repeat LESIs, had 3, then 3 more.     INspect reviewed, in order. UDS 6/6/22 in order.  Began Percocet 7.5mg QID prn, failed Norco 10/325mg QID prn, failed 7.5/325mg QID prn, failed 5/325mg, NSAIDs, APAP, Tramadol in past. Restarted Norco 10mg QID prn.  Cont Baclofen 10mg TID prn.  Had 3 L5/S1 ILESIs with relief, repeated thrice. Plan an LESI q 3 monthly.  Reviewed MRI results.  Cont Gabapentin 600mg TID. Not taking 400mg TID prescribed by Dr. Saldaña.  Treatment plan will consist of continuing current medication as long as it remains effective and is necessary, while evaluating patient at each visit and determining if the medication can be lowered or discontinued, while also using nonopioid therapies to reduce reliance on opioids.  RTC in 3 months for LESI      LUMBAR EPIDURAL STEORID INJECTION      PREOPERATIVE DIAGNOSIS: Lumbar radiculopathy     POSTOPERATIVE DIAGNOSIS: Lumbar radiculopathy     PROCEDURE PERFORMED: Lumbar Epidural Steroid Injection     The patient presents with a history of  [ lumbar ] degenerative disc disease with  radiculitis. The patient presents today for a [ lumbar ]  epidural steroid injection at level right L5/S1. The patient understands the risks  and benefits of the procedure and wishes to proceed.  The patient was seen in the preoperative area.  Patient's consent was obtained and updated.  Vitals were taken.  Patient was then brought to the procedure suite and placed in a prone position. The appropriate anatomic area was widely prepped with Chloraprep and draped in a sterile fashion. Under fluoroscopic guidance using [ caudal tilt AP ] view a 20 gauge styleted tuohy needle was passed through skin anesthetized with 1% Lidocaine without epinephrine at 0.3cm from the needle hub.  The needle was advanced using the continuous loss of resistance  technique into the epidural space. Needle tip placement in the epidural space was confirmed by loss of resistance and injection of [ 2 ] mL of preservative free contrast.  Following this [ 4 ] mL of a solution of Depomedrol 80mg and saline 3ml was slowly injected after negative aspiration. A sterile dressing was placed over the puncture site.     The patient tolerated the procedure with [ no complications ]. They were then brought to the post procedure area where they recovered nicely.

## 2023-03-27 RX ORDER — BACLOFEN 10 MG/1
TABLET ORAL
Qty: 90 TABLET | Refills: 11 | Status: SHIPPED | OUTPATIENT
Start: 2023-03-27 | End: 2023-04-26

## 2023-04-19 ENCOUNTER — TELEPHONE (OUTPATIENT)
Dept: PAIN MEDICINE | Facility: CLINIC | Age: 53
End: 2023-04-19
Payer: MEDICARE

## 2023-04-19 NOTE — TELEPHONE ENCOUNTER
Patient said that he received 75% relief from the last injection that lasted almost 3 months with improvement in his ADL'S.

## 2023-04-26 ENCOUNTER — HOSPITAL ENCOUNTER (OUTPATIENT)
Dept: GENERAL RADIOLOGY | Facility: HOSPITAL | Age: 53
Discharge: HOME | End: 2023-04-26
Payer: MEDICARE

## 2023-04-26 ENCOUNTER — HOSPITAL ENCOUNTER (OUTPATIENT)
Dept: PAIN MEDICINE | Facility: HOSPITAL | Age: 53
Discharge: HOME | End: 2023-04-26
Payer: MEDICARE

## 2023-04-26 VITALS
SYSTOLIC BLOOD PRESSURE: 136 MMHG | RESPIRATION RATE: 16 BRPM | DIASTOLIC BLOOD PRESSURE: 80 MMHG | OXYGEN SATURATION: 72 % | TEMPERATURE: 97.1 F | HEART RATE: 98 BPM

## 2023-04-26 DIAGNOSIS — M54.16 LUMBAR RADICULOPATHY: ICD-10-CM

## 2023-04-26 DIAGNOSIS — Z79.899 HIGH RISK MEDICATION USE: Primary | ICD-10-CM

## 2023-04-26 DIAGNOSIS — G89.29 CHRONIC MIDLINE LOW BACK PAIN WITH SCIATICA, SCIATICA LATERALITY UNSPECIFIED: Primary | ICD-10-CM

## 2023-04-26 DIAGNOSIS — M54.40 CHRONIC MIDLINE LOW BACK PAIN WITH SCIATICA, SCIATICA LATERALITY UNSPECIFIED: Primary | ICD-10-CM

## 2023-04-26 DIAGNOSIS — R52 PAIN: ICD-10-CM

## 2023-04-26 PROCEDURE — 77003 FLUOROGUIDE FOR SPINE INJECT: CPT

## 2023-04-26 PROCEDURE — 25510000001 IOPAMIDOL 41 % SOLUTION: Performed by: PHYSICAL MEDICINE & REHABILITATION

## 2023-04-26 PROCEDURE — 25010000002 METHYLPREDNISOLONE PER 80 MG: Performed by: PHYSICAL MEDICINE & REHABILITATION

## 2023-04-26 RX ORDER — HYDROCODONE BITARTRATE AND ACETAMINOPHEN 10; 325 MG/1; MG/1
1 TABLET ORAL EVERY 6 HOURS PRN
Qty: 120 TABLET | Refills: 0 | Status: SHIPPED | OUTPATIENT
Start: 2023-04-26

## 2023-04-26 RX ORDER — HYDROCODONE BITARTRATE AND ACETAMINOPHEN 10; 325 MG/1; MG/1
1 TABLET ORAL EVERY 6 HOURS PRN
Qty: 120 TABLET | Refills: 0 | Status: SHIPPED | OUTPATIENT
Start: 2023-04-26 | End: 2023-04-26 | Stop reason: SDUPTHER

## 2023-04-26 RX ORDER — GABAPENTIN 600 MG/1
600 TABLET ORAL 3 TIMES DAILY
Qty: 90 TABLET | Refills: 5 | Status: SHIPPED | OUTPATIENT
Start: 2023-04-26 | End: 2023-04-26 | Stop reason: SDUPTHER

## 2023-04-26 RX ORDER — METHYLPREDNISOLONE ACETATE 80 MG/ML
80 INJECTION, SUSPENSION INTRA-ARTICULAR; INTRALESIONAL; INTRAMUSCULAR; SOFT TISSUE ONCE
Status: COMPLETED | OUTPATIENT
Start: 2023-04-26 | End: 2023-04-26

## 2023-04-26 RX ORDER — GABAPENTIN 600 MG/1
600 TABLET ORAL 3 TIMES DAILY
Qty: 90 TABLET | Refills: 5 | Status: SHIPPED | OUTPATIENT
Start: 2023-04-26

## 2023-04-26 RX ADMIN — METHYLPREDNISOLONE ACETATE 80 MG: 80 INJECTION, SUSPENSION INTRA-ARTICULAR; INTRALESIONAL; INTRAMUSCULAR; SOFT TISSUE at 08:24

## 2023-04-26 RX ADMIN — IOPAMIDOL 10 ML: 408 INJECTION, SOLUTION INTRATHECAL at 08:24

## 2023-04-26 NOTE — PROCEDURES
Procedures       History of Present Illness  Low back pain radiating to BLE, 8/10 at worst, 7/10 at best, 7/10 today, always present, varies, began 2005, worsening, stabbing, tingling, worse with all activity and lying down, interferes with ADLs, sleep, activity, some relief with LESIs and b/l SIJ injections in [place], some with Gabapentin and Tizanidine. MRI L-spine with DDD and R S1 impingement. Saw PCP, notes reviewed, as above, with referral for pain management, also has DM2 and HTN, on ASA 325mg. Had 1st right L5/S1 ILESI with 2 days significant relief, still improved, similarly with 2nd and 3rd. Started Norco 5/325mg QID prn, helps but not enough, also 7.5mg QID prn, now 10mg QID prn, helps, has pain with severe weather. Would like to repeat LESIs, had 3, then 3 more.     [facility] reviewed, in order. UDS 6/6/22 in order.  Began Percocet 7.5mg QID prn, failed Norco 10/325mg QID prn, failed 7.5/325mg QID prn, failed 5/325mg, NSAIDs, APAP, Tramadol in past. Restarted Norco 10mg QID prn.  Cont Baclofen 10mg TID prn.  Had 3 L5/S1 ILESIs with relief, repeated thrice. Plan an LESI q 3 monthly.  Reviewed MRI results.  Cont Gabapentin 600mg TID. Not taking 400mg TID prescribed by Dr. Saldaña.  Treatment plan will consist of continuing current medication as long as it remains effective and is necessary, while evaluating patient at each visit and determining if the medication can be lowered or discontinued, while also using nonopioid therapies to reduce reliance on opioids.  RTC in 3 months for LESI      LUMBAR EPIDURAL STEORID INJECTION      PREOPERATIVE DIAGNOSIS: Lumbar radiculopathy     POSTOPERATIVE DIAGNOSIS: Lumbar radiculopathy     PROCEDURE PERFORMED: Lumbar Epidural Steroid Injection     The patient presents with a history of  [ lumbar ] degenerative disc disease with  radiculitis. The patient presents today for a [ lumbar ]  epidural steroid injection at level right L5/S1. The patient understands the risks  and benefits of the procedure and wishes to proceed.  The patient was seen in the preoperative area.  Patient's consent was obtained and updated.  Vitals were taken.  Patient was then brought to the procedure suite and placed in a prone position. The appropriate anatomic area was widely prepped with Chloraprep and draped in a sterile fashion. Under fluoroscopic guidance using [ caudal tilt AP ] view a 20 gauge styleted tuohy needle was passed through skin anesthetized with 1% Lidocaine without epinephrine at 0.3cm from the needle hub.  The needle was advanced using the continuous loss of resistance  technique into the epidural space. Needle tip placement in the epidural space was confirmed by loss of resistance and injection of [ 2 ] mL of preservative free contrast.  Following this [ 4 ] mL of a solution of Depomedrol 80mg and saline 3ml was slowly injected after negative aspiration. A sterile dressing was placed over the puncture site.     The patient tolerated the procedure with [ no complications ]. They were then brought to the post procedure area where they recovered nicely.

## 2023-08-02 ENCOUNTER — HOSPITAL ENCOUNTER (OUTPATIENT)
Dept: GENERAL RADIOLOGY | Facility: HOSPITAL | Age: 53
Discharge: HOME | End: 2023-08-02
Payer: MEDICARE

## 2023-08-02 ENCOUNTER — HOSPITAL ENCOUNTER (OUTPATIENT)
Dept: PAIN MEDICINE | Facility: HOSPITAL | Age: 53
Discharge: HOME | End: 2023-08-02
Payer: MEDICARE

## 2023-08-02 VITALS
HEART RATE: 76 BPM | SYSTOLIC BLOOD PRESSURE: 107 MMHG | DIASTOLIC BLOOD PRESSURE: 68 MMHG | OXYGEN SATURATION: 95 % | TEMPERATURE: 97.4 F | RESPIRATION RATE: 16 BRPM

## 2023-08-02 DIAGNOSIS — M54.40 CHRONIC MIDLINE LOW BACK PAIN WITH SCIATICA, SCIATICA LATERALITY UNSPECIFIED: Primary | ICD-10-CM

## 2023-08-02 DIAGNOSIS — M46.1 SACROILIITIS, NOT ELSEWHERE CLASSIFIED: ICD-10-CM

## 2023-08-02 DIAGNOSIS — M51.36 DEGENERATION OF INTERVERTEBRAL DISC OF LUMBAR REGION: ICD-10-CM

## 2023-08-02 DIAGNOSIS — G89.29 CHRONIC MIDLINE LOW BACK PAIN WITH SCIATICA, SCIATICA LATERALITY UNSPECIFIED: Primary | ICD-10-CM

## 2023-08-02 DIAGNOSIS — M54.16 LUMBAR RADICULOPATHY: ICD-10-CM

## 2023-08-02 DIAGNOSIS — R52 PAIN: ICD-10-CM

## 2023-08-02 PROCEDURE — 25510000001 IOPAMIDOL 41 % SOLUTION: Performed by: PHYSICAL MEDICINE & REHABILITATION

## 2023-08-02 PROCEDURE — 62323 NJX INTERLAMINAR LMBR/SAC: CPT | Performed by: PHYSICAL MEDICINE & REHABILITATION

## 2023-08-02 PROCEDURE — 25010000002 METHYLPREDNISOLONE PER 80 MG: Performed by: PHYSICAL MEDICINE & REHABILITATION

## 2023-08-02 PROCEDURE — 77003 FLUOROGUIDE FOR SPINE INJECT: CPT

## 2023-08-02 RX ORDER — HYDROCODONE BITARTRATE AND ACETAMINOPHEN 10; 325 MG/1; MG/1
1 TABLET ORAL EVERY 6 HOURS PRN
Qty: 120 TABLET | Refills: 0 | Status: SHIPPED | OUTPATIENT
Start: 2023-08-02

## 2023-08-02 RX ORDER — BACLOFEN 10 MG/1
10 TABLET ORAL 3 TIMES DAILY PRN
COMMUNITY
Start: 2023-06-26

## 2023-08-02 RX ORDER — METHYLPREDNISOLONE ACETATE 80 MG/ML
80 INJECTION, SUSPENSION INTRA-ARTICULAR; INTRALESIONAL; INTRAMUSCULAR; SOFT TISSUE ONCE
Status: COMPLETED | OUTPATIENT
Start: 2023-08-02 | End: 2023-08-02

## 2023-08-02 RX ADMIN — IOPAMIDOL 10 ML: 408 INJECTION, SOLUTION INTRATHECAL at 08:35

## 2023-08-02 RX ADMIN — METHYLPREDNISOLONE ACETATE 80 MG: 80 INJECTION, SUSPENSION INTRA-ARTICULAR; INTRALESIONAL; INTRAMUSCULAR; SOFT TISSUE at 08:35

## 2023-10-19 ENCOUNTER — TELEPHONE (OUTPATIENT)
Dept: PAIN MEDICINE | Facility: CLINIC | Age: 53
End: 2023-10-19
Payer: MEDICARE

## 2023-10-19 RX ORDER — HYDROCODONE BITARTRATE AND ACETAMINOPHEN 7.5; 325 MG/1; MG/1
1 TABLET ORAL EVERY 6 HOURS PRN
Qty: 120 TABLET | Refills: 0 | Status: SHIPPED | OUTPATIENT
Start: 2023-10-19

## 2023-10-19 NOTE — TELEPHONE ENCOUNTER
Caller: Sung Encarnacion    Relationship: Self    Best call back number:     Requested Prescriptions:   HYDROcodone-acetaminophen (NORCO)  MG per tablet     Pharmacy where request should be sent:  [facility] I ADVISED PATIENT HE MAY NEED TO CALL AROUND TO SEE WHO HAS THE MEDICINE HE NEEDS    Last office visit with prescribing clinician: 10/3/2022   Last telemedicine visit with prescribing clinician: Visit date not found   Next office visit with prescribing clinician: Visit date not found     Additional details provided by patient: [facility] IN [place] DOESN'T HAVE THE MEDICATION    Does the patient have less than a 3 day supply:  [x] Yes  [] No    Would you like a call back once the refill request has been completed: [x] Yes [] No    If the office needs to give you a call back, can they leave a voicemail: [x] Yes [] No    Kade Salguero Rep   10/19/23 09:10 EDT

## 2023-11-15 ENCOUNTER — HOSPITAL ENCOUNTER (OUTPATIENT)
Dept: PAIN MEDICINE | Facility: HOSPITAL | Age: 53
Discharge: HOME | End: 2023-11-15
Payer: MEDICARE

## 2023-11-15 ENCOUNTER — HOSPITAL ENCOUNTER (OUTPATIENT)
Dept: GENERAL RADIOLOGY | Facility: HOSPITAL | Age: 53
Discharge: HOME | End: 2023-11-15
Payer: MEDICARE

## 2023-11-15 VITALS
HEART RATE: 79 BPM | TEMPERATURE: 96.6 F | SYSTOLIC BLOOD PRESSURE: 127 MMHG | DIASTOLIC BLOOD PRESSURE: 69 MMHG | OXYGEN SATURATION: 97 % | RESPIRATION RATE: 16 BRPM

## 2023-11-15 DIAGNOSIS — G89.29 CHRONIC MIDLINE LOW BACK PAIN WITH SCIATICA, SCIATICA LATERALITY UNSPECIFIED: Primary | ICD-10-CM

## 2023-11-15 DIAGNOSIS — R52 PAIN: ICD-10-CM

## 2023-11-15 DIAGNOSIS — M54.40 CHRONIC MIDLINE LOW BACK PAIN WITH SCIATICA, SCIATICA LATERALITY UNSPECIFIED: Primary | ICD-10-CM

## 2023-11-15 DIAGNOSIS — M46.1 SACROILIITIS, NOT ELSEWHERE CLASSIFIED: ICD-10-CM

## 2023-11-15 DIAGNOSIS — M79.10 MYALGIA: ICD-10-CM

## 2023-11-15 DIAGNOSIS — M54.16 LUMBAR RADICULOPATHY: ICD-10-CM

## 2023-11-15 DIAGNOSIS — Z79.899 OTHER LONG TERM (CURRENT) DRUG THERAPY: ICD-10-CM

## 2023-11-15 DIAGNOSIS — M51.36 DEGENERATION OF INTERVERTEBRAL DISC OF LUMBAR REGION: ICD-10-CM

## 2023-11-15 PROCEDURE — 77003 FLUOROGUIDE FOR SPINE INJECT: CPT

## 2023-11-15 PROCEDURE — 25010000002 METHYLPREDNISOLONE PER 80 MG: Performed by: PHYSICAL MEDICINE & REHABILITATION

## 2023-11-15 PROCEDURE — 25510000001 IOPAMIDOL 41 % SOLUTION: Performed by: PHYSICAL MEDICINE & REHABILITATION

## 2023-11-15 RX ORDER — GABAPENTIN 600 MG/1
600 TABLET ORAL 3 TIMES DAILY
Qty: 90 TABLET | Refills: 5 | Status: SHIPPED | OUTPATIENT
Start: 2023-11-15

## 2023-11-15 RX ORDER — HYDROCODONE BITARTRATE AND ACETAMINOPHEN 10; 325 MG/1; MG/1
1 TABLET ORAL EVERY 6 HOURS PRN
Qty: 120 TABLET | Refills: 0 | Status: SHIPPED | OUTPATIENT
Start: 2023-11-15

## 2023-11-15 RX ORDER — TIZANIDINE 4 MG/1
4 TABLET ORAL EVERY 8 HOURS PRN
Qty: 90 TABLET | Refills: 5 | Status: SHIPPED | OUTPATIENT
Start: 2023-11-15

## 2023-11-15 RX ORDER — METHYLPREDNISOLONE ACETATE 80 MG/ML
80 INJECTION, SUSPENSION INTRA-ARTICULAR; INTRALESIONAL; INTRAMUSCULAR; SOFT TISSUE ONCE
Status: COMPLETED | OUTPATIENT
Start: 2023-11-15 | End: 2023-11-15

## 2023-11-15 RX ORDER — IOPAMIDOL 408 MG/ML
10 INJECTION, SOLUTION INTRATHECAL
Status: COMPLETED | OUTPATIENT
Start: 2023-11-15 | End: 2023-11-15

## 2023-11-15 RX ORDER — LEVOCETIRIZINE DIHYDROCHLORIDE 5 MG/1
5 TABLET, FILM COATED ORAL EVERY EVENING
COMMUNITY
Start: 2023-08-10

## 2023-11-15 RX ADMIN — METHYLPREDNISOLONE ACETATE 80 MG: 80 INJECTION, SUSPENSION INTRA-ARTICULAR; INTRALESIONAL; INTRAMUSCULAR; SOFT TISSUE at 09:03

## 2023-11-15 RX ADMIN — IOPAMIDOL 10 ML: 408 INJECTION, SOLUTION INTRATHECAL at 09:03

## 2023-11-15 NOTE — H&P
Subjective   Sung Encarnacion is a 53 y.o. male.     History of Present Illness  Low back pain radiating to BLE, 8/10 at worst, 7/10 at best, 7/10 today, always present, varies, began 2005, worsening, stabbing, tingling, worse with all activity and lying down, interferes with ADLs, sleep, activity, some relief with LESIs and b/l SIJ injections in [place], some with Gabapentin and Tizanidine. MRI L-spine with DDD and R S1 impingement. Saw PCP, notes reviewed, as above, with referral for pain management, also has DM2 and HTN, on ASA 325mg. Had 1st right L5/S1 ILESI with 2 days significant relief, still improved, similarly with 2nd and 3rd. Started Norco 5/325mg QID prn, helps but not enough, also 7.5mg QID prn, now 10mg QID prn, helps, has pain with severe weather. Would like to repeat LESIs, had 3, then 3 more.   Back Pain  Associated symptoms include numbness. Pertinent negatives include no abdominal pain, bladder incontinence, chest pain, fever or weakness.        The following portions of the patient's history were reviewed and updated as appropriate: allergies, current medications, past family history, past medical history, past social history, past surgical history and problem list.    Review of Systems   Constitutional:  Negative for chills, fatigue and fever.   HENT:  Negative for hearing loss and trouble swallowing.    Eyes:  Negative for visual disturbance.   Respiratory:  Negative for shortness of breath.    Cardiovascular:  Negative for chest pain.   Gastrointestinal:  Negative for abdominal pain, constipation, diarrhea, nausea and vomiting.   Genitourinary:  Negative for urinary incontinence.   Musculoskeletal:  Positive for back pain. Negative for arthralgias, joint swelling, myalgias and neck pain.   Neurological:  Positive for numbness and headache. Negative for dizziness and weakness.       Objective   Physical Exam   Constitutional: He is oriented to person, place, and time. He appears  well-developed and well-nourished.   HENT:   Head: Normocephalic and atraumatic.   Eyes: Pupils are equal, round, and reactive to light.   Cardiovascular: Normal rate, regular rhythm and normal heart sounds.   Pulmonary/Chest: Breath sounds normal.   Abdominal: Soft. Normal appearance and bowel sounds are normal. He exhibits no distension. There is no abdominal tenderness.   Neurological: He is alert and oriented to person, place, and time. He has normal reflexes. He displays normal reflexes. A sensory deficit is present.   Decreased in b/l stocking distribution       Psychiatric: His behavior is normal. Mood, judgment and thought content normal.         Assessment & Plan   Diagnoses and all orders for this visit:    1. Chronic midline low back pain with sciatica, sciatica laterality unspecified (Primary)    2. Lumbar radiculopathy    3. Sacroiliitis, not elsewhere classified    4. Degeneration of intervertebral disc of lumbar region    5. Other long term (current) drug therapy    6. Myalgia      [facility] reviewed, in order. UDS 4/26/23 in order.  Began Percocet 7.5mg QID prn, failed Norco 10/325mg QID prn, failed 7.5/325mg QID prn, failed 5/325mg, NSAIDs, APAP, Tramadol in past. Restarted Norco 10mg QID prn. Filled 10/20/23 per new [facility].  Stop Baclofen 10mg TID prn. Restart Tizanidine 4mg TID prn.  Had 3 L5/S1 ILESIs with relief, repeated thrice. Plan an LESI q 3 monthly.  Reviewed MRI results.  Cont Gabapentin 600mg TID. Not taking 400mg TID prescribed by Dr. Saldaña.  Treatment plan will consist of continuing current medication as long as it remains effective and is necessary, while evaluating patient at each visit and determining if the medication can be lowered or discontinued, while also using nonopioid therapies to reduce reliance on opioids.  RTC in 3 months for [facility]

## 2023-11-15 NOTE — PROCEDURES
Procedures     History of Present Illness  Low back pain radiating to BLE. Had 1st right L5/S1 ILESI with 2 days significant relief, still improved, similarly with 2nd and 3rd. Started Norco 5/325mg QID prn, helps but not enough, also 7.5mg QID prn, now 10mg QID prn, helps, has pain with severe weather. Here to repeat LESIs. Denies allergy to iodine or contrast, anticoagulation, current infection or ABX.     Had 3 L5/S1 ILESIs with relief, repeated thrice. Plan an LESI q 3 monthly. Perform LESI today.  RTC in 3 months for LESI, f/u.      LUMBAR EPIDURAL STEORID INJECTION      PREOPERATIVE DIAGNOSIS: Lumbar radiculopathy     POSTOPERATIVE DIAGNOSIS: Lumbar radiculopathy     PROCEDURE PERFORMED: Lumbar Epidural Steroid Injection     The patient presents with a history of  [ lumbar ] degenerative disc disease with  radiculitis. The patient presents today for a [ lumbar ]  epidural steroid injection at level right L5/S1. The patient understands the risks and benefits of the procedure and wishes to proceed.  The patient was seen in the preoperative area.  Patient's consent was obtained and updated.  Vitals were taken.  Patient was then brought to the procedure suite and placed in a prone position. The appropriate anatomic area was widely prepped with Chloraprep and draped in a sterile fashion. Under fluoroscopic guidance using [ caudal tilt AP ] view a 20 gauge styleted tuohy needle was passed through skin anesthetized with 1% Lidocaine without epinephrine at 0.3cm from the needle hub.  The needle was advanced using the continuous loss of resistance  technique into the epidural space. Needle tip placement in the epidural space was confirmed by loss of resistance and injection of [ 2 ] mL of preservative free contrast.  Following this [ 4 ] mL of a solution of Depomedrol 80mg and saline 3ml was slowly injected after negative aspiration. A sterile dressing was placed over the puncture site.     The patient tolerated the  procedure with [ no complications ]. They were then brought to the post procedure area where they recovered nicely.

## 2024-02-21 ENCOUNTER — HOSPITAL ENCOUNTER (OUTPATIENT)
Dept: GENERAL RADIOLOGY | Facility: HOSPITAL | Age: 54
Discharge: HOME | End: 2024-02-21
Payer: MEDICARE

## 2024-02-21 ENCOUNTER — HOSPITAL ENCOUNTER (OUTPATIENT)
Dept: PAIN MEDICINE | Facility: HOSPITAL | Age: 54
Discharge: HOME | End: 2024-02-21
Payer: MEDICARE

## 2024-02-21 VITALS
TEMPERATURE: 96.9 F | DIASTOLIC BLOOD PRESSURE: 59 MMHG | OXYGEN SATURATION: 95 % | HEART RATE: 77 BPM | RESPIRATION RATE: 16 BRPM | SYSTOLIC BLOOD PRESSURE: 94 MMHG

## 2024-02-21 DIAGNOSIS — R52 PAIN: ICD-10-CM

## 2024-02-21 DIAGNOSIS — M51.36 DEGENERATION OF INTERVERTEBRAL DISC OF LUMBAR REGION: ICD-10-CM

## 2024-02-21 DIAGNOSIS — Z79.899 OTHER LONG TERM (CURRENT) DRUG THERAPY: ICD-10-CM

## 2024-02-21 DIAGNOSIS — M54.16 LUMBAR RADICULOPATHY: ICD-10-CM

## 2024-02-21 DIAGNOSIS — M46.1 SACROILIITIS, NOT ELSEWHERE CLASSIFIED: ICD-10-CM

## 2024-02-21 DIAGNOSIS — M54.40 CHRONIC MIDLINE LOW BACK PAIN WITH SCIATICA, SCIATICA LATERALITY UNSPECIFIED: Primary | ICD-10-CM

## 2024-02-21 DIAGNOSIS — Z79.899 HIGH RISK MEDICATION USE: Primary | ICD-10-CM

## 2024-02-21 DIAGNOSIS — M79.10 MYALGIA: ICD-10-CM

## 2024-02-21 DIAGNOSIS — G89.29 CHRONIC MIDLINE LOW BACK PAIN WITH SCIATICA, SCIATICA LATERALITY UNSPECIFIED: Primary | ICD-10-CM

## 2024-02-21 PROCEDURE — 25010000002 METHYLPREDNISOLONE PER 80 MG: Performed by: PHYSICAL MEDICINE & REHABILITATION

## 2024-02-21 PROCEDURE — 25510000001 IOPAMIDOL 41 % SOLUTION: Performed by: PHYSICAL MEDICINE & REHABILITATION

## 2024-02-21 PROCEDURE — 77003 FLUOROGUIDE FOR SPINE INJECT: CPT

## 2024-02-21 RX ORDER — HYDROCODONE BITARTRATE AND ACETAMINOPHEN 10; 325 MG/1; MG/1
1 TABLET ORAL EVERY 6 HOURS PRN
Qty: 120 TABLET | Refills: 0 | Status: SHIPPED | OUTPATIENT
Start: 2024-02-21

## 2024-02-21 RX ORDER — IOPAMIDOL 408 MG/ML
10 INJECTION, SOLUTION INTRATHECAL
Status: COMPLETED | OUTPATIENT
Start: 2024-02-21 | End: 2024-02-21

## 2024-02-21 RX ORDER — GABAPENTIN 600 MG/1
600 TABLET ORAL 3 TIMES DAILY
Qty: 90 TABLET | Refills: 5 | Status: SHIPPED | OUTPATIENT
Start: 2024-02-21

## 2024-02-21 RX ORDER — TIRZEPATIDE 7.5 MG/.5ML
INJECTION, SOLUTION SUBCUTANEOUS
COMMUNITY
Start: 2024-01-22

## 2024-02-21 RX ORDER — METHYLPREDNISOLONE ACETATE 80 MG/ML
80 INJECTION, SUSPENSION INTRA-ARTICULAR; INTRALESIONAL; INTRAMUSCULAR; SOFT TISSUE ONCE
Status: COMPLETED | OUTPATIENT
Start: 2024-02-21 | End: 2024-02-21

## 2024-02-21 RX ADMIN — METHYLPREDNISOLONE ACETATE 80 MG: 80 INJECTION, SUSPENSION INTRA-ARTICULAR; INTRALESIONAL; INTRAMUSCULAR; SOFT TISSUE at 08:54

## 2024-02-21 RX ADMIN — IOPAMIDOL 10 ML: 408 INJECTION, SOLUTION INTRATHECAL at 08:54

## 2024-02-21 NOTE — PROCEDURES
Procedures     History of Present Illness  Low back pain radiating to BLE. Had 1st right L5/S1 ILESI with 2 days significant relief, still improved, similarly with 2nd and 3rd. Started Norco 5/325mg QID prn, helps but not enough, also 7.5mg QID prn, now 10mg QID prn, helps, has pain with severe weather. Here to repeat LESIs. Denies allergy to iodine or contrast, anticoagulation, current infection or ABX.     UDS in order 4/26/23. [facility] reviewed, in order.   Had 3 L5/S1 ILESIs with relief, repeated thrice. Plan an LESI q 3 monthly. Perform LESI today.  Cont Norco 10mg QID prn, filled 1/19/24. Cont Gabapentin.  RTC in 3 months for LESI, f/u.      LUMBAR EPIDURAL STEORID INJECTION      PREOPERATIVE DIAGNOSIS: Lumbar radiculopathy     POSTOPERATIVE DIAGNOSIS: Lumbar radiculopathy     PROCEDURE PERFORMED: Lumbar Epidural Steroid Injection     The patient presents with a history of  [ lumbar ] degenerative disc disease with  radiculitis. The patient presents today for a [ lumbar ]  epidural steroid injection at level right L5/S1. The patient understands the risks and benefits of the procedure and wishes to proceed.  The patient was seen in the preoperative area.  Patient's consent was obtained and updated.  Vitals were taken.  Patient was then brought to the procedure suite and placed in a prone position. The appropriate anatomic area was widely prepped with Chloraprep and draped in a sterile fashion. Under fluoroscopic guidance using [ caudal tilt AP ] view a 20 gauge styleted tuohy needle was passed through skin anesthetized with 1% Lidocaine without epinephrine at 1.3cm from the needle hub.  The needle was advanced using the continuous loss of resistance  technique into the epidural space. Needle tip placement in the epidural space was confirmed by loss of resistance and injection of [ 2 ] mL of preservative free contrast.  Following this [ 4 ] mL of a solution of Depomedrol 80mg and saline 3ml was slowly injected  after negative aspiration. A sterile dressing was placed over the puncture site.     The patient tolerated the procedure with [ no complications ]. They were then brought to the post procedure area where they recovered nicely.

## 2024-02-29 ENCOUNTER — TELEPHONE (OUTPATIENT)
Dept: PAIN MEDICINE | Facility: HOSPITAL | Age: 54
End: 2024-02-29
Payer: MEDICARE

## 2024-02-29 DIAGNOSIS — M54.40 CHRONIC MIDLINE LOW BACK PAIN WITH SCIATICA, SCIATICA LATERALITY UNSPECIFIED: ICD-10-CM

## 2024-02-29 DIAGNOSIS — G89.29 CHRONIC MIDLINE LOW BACK PAIN WITH SCIATICA, SCIATICA LATERALITY UNSPECIFIED: ICD-10-CM

## 2024-02-29 RX ORDER — HYDROCODONE BITARTRATE AND ACETAMINOPHEN 10; 325 MG/1; MG/1
TABLET ORAL
Qty: 42 TABLET | Refills: 0 | Status: SHIPPED | OUTPATIENT
Start: 2024-02-29

## 2024-08-14 DIAGNOSIS — M54.16 LUMBAR RADICULOPATHY: ICD-10-CM

## 2024-08-14 RX ORDER — GABAPENTIN 600 MG/1
600 TABLET ORAL 3 TIMES DAILY
Qty: 90 TABLET | Refills: 5 | OUTPATIENT
Start: 2024-08-14